# Patient Record
Sex: FEMALE | Race: WHITE | Employment: OTHER | URBAN - METROPOLITAN AREA
[De-identification: names, ages, dates, MRNs, and addresses within clinical notes are randomized per-mention and may not be internally consistent; named-entity substitution may affect disease eponyms.]

---

## 2023-07-12 ENCOUNTER — TELEPHONE (OUTPATIENT)
Dept: PRIMARY CARE | Facility: CLINIC | Age: 68
End: 2023-07-12
Payer: MEDICARE

## 2023-07-12 DIAGNOSIS — E78.89 LIPIDS ABNORMAL: ICD-10-CM

## 2023-07-12 RX ORDER — LEVOTHYROXINE SODIUM 75 UG/1
75 TABLET ORAL DAILY
COMMUNITY
End: 2023-07-12 | Stop reason: SDUPTHER

## 2023-07-12 RX ORDER — LEVOTHYROXINE SODIUM 75 UG/1
75 TABLET ORAL DAILY
Qty: 90 TABLET | Refills: 0 | Status: SHIPPED | OUTPATIENT
Start: 2023-07-12 | End: 2023-10-12 | Stop reason: SDUPTHER

## 2023-08-07 ENCOUNTER — LAB (OUTPATIENT)
Dept: LAB | Facility: LAB | Age: 68
End: 2023-08-07
Payer: MEDICARE

## 2023-08-07 DIAGNOSIS — Z00.00 HEALTHCARE MAINTENANCE: ICD-10-CM

## 2023-08-07 LAB
ALANINE AMINOTRANSFERASE (SGPT) (U/L) IN SER/PLAS: 11 U/L (ref 7–45)
ALBUMIN (G/DL) IN SER/PLAS: 4.1 G/DL (ref 3.4–5)
ALKALINE PHOSPHATASE (U/L) IN SER/PLAS: 63 U/L (ref 33–136)
ANION GAP IN SER/PLAS: 12 MMOL/L (ref 10–20)
APPEARANCE, URINE: ABNORMAL
ASPARTATE AMINOTRANSFERASE (SGOT) (U/L) IN SER/PLAS: 17 U/L (ref 9–39)
BASOPHILS (10*3/UL) IN BLOOD BY AUTOMATED COUNT: 0.05 X10E9/L (ref 0–0.1)
BASOPHILS/100 LEUKOCYTES IN BLOOD BY AUTOMATED COUNT: 1 % (ref 0–2)
BILIRUBIN TOTAL (MG/DL) IN SER/PLAS: 0.4 MG/DL (ref 0–1.2)
BILIRUBIN, URINE: NEGATIVE
BLOOD, URINE: NEGATIVE
CALCIDIOL (25 OH VITAMIN D3) (NG/ML) IN SER/PLAS: 30 NG/ML
CALCIUM (MG/DL) IN SER/PLAS: 8.6 MG/DL (ref 8.6–10.3)
CARBON DIOXIDE, TOTAL (MMOL/L) IN SER/PLAS: 27 MMOL/L (ref 21–32)
CHLORIDE (MMOL/L) IN SER/PLAS: 104 MMOL/L (ref 98–107)
CHOLESTEROL (MG/DL) IN SER/PLAS: 273 MG/DL (ref 0–199)
CHOLESTEROL IN HDL (MG/DL) IN SER/PLAS: 60.1 MG/DL
CHOLESTEROL/HDL RATIO: 4.5
COLOR, URINE: YELLOW
CREATININE (MG/DL) IN SER/PLAS: 0.83 MG/DL (ref 0.5–1.05)
EOSINOPHILS (10*3/UL) IN BLOOD BY AUTOMATED COUNT: 0.07 X10E9/L (ref 0–0.7)
EOSINOPHILS/100 LEUKOCYTES IN BLOOD BY AUTOMATED COUNT: 1.5 % (ref 0–6)
ERYTHROCYTE DISTRIBUTION WIDTH (RATIO) BY AUTOMATED COUNT: 13 % (ref 11.5–14.5)
ERYTHROCYTE MEAN CORPUSCULAR HEMOGLOBIN CONCENTRATION (G/DL) BY AUTOMATED: 31.8 G/DL (ref 32–36)
ERYTHROCYTE MEAN CORPUSCULAR VOLUME (FL) BY AUTOMATED COUNT: 90 FL (ref 80–100)
ERYTHROCYTES (10*6/UL) IN BLOOD BY AUTOMATED COUNT: 4.96 X10E12/L (ref 4–5.2)
GFR FEMALE: 77 ML/MIN/1.73M2
GLUCOSE (MG/DL) IN SER/PLAS: 104 MG/DL (ref 74–99)
GLUCOSE, URINE: NEGATIVE MG/DL
HEMATOCRIT (%) IN BLOOD BY AUTOMATED COUNT: 44.7 % (ref 36–46)
HEMOGLOBIN (G/DL) IN BLOOD: 14.2 G/DL (ref 12–16)
IMMATURE GRANULOCYTES/100 LEUKOCYTES IN BLOOD BY AUTOMATED COUNT: 0.2 % (ref 0–0.9)
KETONES, URINE: NEGATIVE MG/DL
LDL: 188 MG/DL (ref 0–99)
LEUKOCYTE ESTERASE, URINE: ABNORMAL
LEUKOCYTES (10*3/UL) IN BLOOD BY AUTOMATED COUNT: 4.8 X10E9/L (ref 4.4–11.3)
LYMPHOCYTES (10*3/UL) IN BLOOD BY AUTOMATED COUNT: 1.91 X10E9/L (ref 1.2–4.8)
LYMPHOCYTES/100 LEUKOCYTES IN BLOOD BY AUTOMATED COUNT: 39.6 % (ref 13–44)
MONOCYTES (10*3/UL) IN BLOOD BY AUTOMATED COUNT: 0.42 X10E9/L (ref 0.1–1)
MONOCYTES/100 LEUKOCYTES IN BLOOD BY AUTOMATED COUNT: 8.7 % (ref 2–10)
MUCUS, URINE: ABNORMAL /LPF
NEUTROPHILS (10*3/UL) IN BLOOD BY AUTOMATED COUNT: 2.36 X10E9/L (ref 1.2–7.7)
NEUTROPHILS/100 LEUKOCYTES IN BLOOD BY AUTOMATED COUNT: 49 % (ref 40–80)
NITRITE, URINE: NEGATIVE
PH, URINE: 5 (ref 5–8)
PLATELETS (10*3/UL) IN BLOOD AUTOMATED COUNT: 193 X10E9/L (ref 150–450)
POTASSIUM (MMOL/L) IN SER/PLAS: 4.3 MMOL/L (ref 3.5–5.3)
PROTEIN TOTAL: 6.4 G/DL (ref 6.4–8.2)
PROTEIN, URINE: NEGATIVE MG/DL
RBC, URINE: 1 /HPF (ref 0–5)
SODIUM (MMOL/L) IN SER/PLAS: 139 MMOL/L (ref 136–145)
SPECIFIC GRAVITY, URINE: 1.02 (ref 1–1.03)
SQUAMOUS EPITHELIAL CELLS, URINE: 13 /HPF
THYROTROPIN (MIU/L) IN SER/PLAS BY DETECTION LIMIT <= 0.05 MIU/L: 4.49 MIU/L (ref 0.44–3.98)
THYROXINE (T4) FREE (NG/DL) IN SER/PLAS: 0.86 NG/DL (ref 0.61–1.12)
TRIGLYCERIDE (MG/DL) IN SER/PLAS: 124 MG/DL (ref 0–149)
UREA NITROGEN (MG/DL) IN SER/PLAS: 19 MG/DL (ref 6–23)
UROBILINOGEN, URINE: <2 MG/DL (ref 0–1.9)
VLDL: 25 MG/DL (ref 0–40)
WBC, URINE: 9 /HPF (ref 0–5)

## 2023-08-07 PROCEDURE — 80053 COMPREHEN METABOLIC PANEL: CPT

## 2023-08-07 PROCEDURE — 82306 VITAMIN D 25 HYDROXY: CPT

## 2023-08-07 PROCEDURE — 84443 ASSAY THYROID STIM HORMONE: CPT

## 2023-08-07 PROCEDURE — 85025 COMPLETE CBC W/AUTO DIFF WBC: CPT

## 2023-08-07 PROCEDURE — 80061 LIPID PANEL: CPT

## 2023-08-07 PROCEDURE — 36415 COLL VENOUS BLD VENIPUNCTURE: CPT

## 2023-08-07 PROCEDURE — 81001 URINALYSIS AUTO W/SCOPE: CPT

## 2023-08-07 PROCEDURE — 84439 ASSAY OF FREE THYROXINE: CPT

## 2023-08-09 ENCOUNTER — OFFICE VISIT (OUTPATIENT)
Dept: PRIMARY CARE | Facility: CLINIC | Age: 68
End: 2023-08-09
Payer: MEDICARE

## 2023-08-09 VITALS
HEIGHT: 66 IN | HEART RATE: 63 BPM | RESPIRATION RATE: 16 BRPM | BODY MASS INDEX: 25.75 KG/M2 | DIASTOLIC BLOOD PRESSURE: 71 MMHG | WEIGHT: 160.2 LBS | SYSTOLIC BLOOD PRESSURE: 105 MMHG | OXYGEN SATURATION: 97 %

## 2023-08-09 DIAGNOSIS — Z00.00 HEALTHCARE MAINTENANCE: Primary | ICD-10-CM

## 2023-08-09 DIAGNOSIS — Z12.12 SCREENING FOR COLORECTAL CANCER: ICD-10-CM

## 2023-08-09 DIAGNOSIS — Z13.6 SCREENING FOR CARDIOVASCULAR CONDITION: ICD-10-CM

## 2023-08-09 DIAGNOSIS — Z12.11 SCREENING FOR COLORECTAL CANCER: ICD-10-CM

## 2023-08-09 PROBLEM — R51.9 BILATERAL HEADACHES: Status: ACTIVE | Noted: 2023-08-09
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
## 2023-08-09 PROBLEM — M75.80 ROTATOR CUFF TENDINITIS: Status: ACTIVE | Noted: 2023-08-09

## 2023-08-09 PROBLEM — E78.5 HYPERLIPIDEMIA: Status: ACTIVE | Noted: 2023-08-09

## 2023-08-09 PROBLEM — R42 VERTIGO: Status: ACTIVE | Noted: 2023-08-09

## 2023-08-09 PROBLEM — M19.041 PRIMARY OSTEOARTHRITIS OF BOTH HANDS: Status: ACTIVE | Noted: 2023-08-09

## 2023-08-09 PROBLEM — M19.042 PRIMARY OSTEOARTHRITIS OF BOTH HANDS: Status: ACTIVE | Noted: 2023-08-09

## 2023-08-09 PROBLEM — K21.9 GERD (GASTROESOPHAGEAL REFLUX DISEASE): Status: ACTIVE | Noted: 2023-08-09

## 2023-08-09 PROBLEM — H61.22 IMPACTED CERUMEN OF LEFT EAR: Status: ACTIVE | Noted: 2023-08-09

## 2023-08-09 PROBLEM — S16.1XXA NECK STRAIN: Status: ACTIVE | Noted: 2023-08-09

## 2023-08-09 PROBLEM — R82.998 LEUKOCYTES IN URINE: Status: ACTIVE | Noted: 2023-08-09

## 2023-08-09 PROBLEM — E79.0 HYPERURICEMIA: Status: ACTIVE | Noted: 2023-08-09

## 2023-08-09 PROBLEM — I10 BENIGN ESSENTIAL HYPERTENSION: Status: ACTIVE | Noted: 2023-08-09

## 2023-08-09 PROBLEM — M25.512 LEFT SHOULDER PAIN: Status: ACTIVE | Noted: 2023-08-09

## 2023-08-09 PROBLEM — S46.219A BICEPS STRAIN: Status: ACTIVE | Noted: 2023-08-09

## 2023-08-09 PROBLEM — M25.50 JOINT PAIN: Status: ACTIVE | Noted: 2023-08-09

## 2023-08-09 PROBLEM — M54.17 LUMBOSACRAL NEURITIS: Status: ACTIVE | Noted: 2023-08-09

## 2023-08-09 PROBLEM — M13.0 POLYARTHRITIS: Status: ACTIVE | Noted: 2023-08-09

## 2023-08-09 PROBLEM — M17.12 PRIMARY OSTEOARTHRITIS OF LEFT KNEE: Status: ACTIVE | Noted: 2023-08-09

## 2023-08-09 PROBLEM — E78.00 ELEVATED SERUM CHOLESTEROL: Status: ACTIVE | Noted: 2023-08-09

## 2023-08-09 PROBLEM — E55.9 VITAMIN D DEFICIENCY: Status: ACTIVE | Noted: 2023-08-09

## 2023-08-09 PROBLEM — K64.9 HEMORRHOID: Status: ACTIVE | Noted: 2023-08-09

## 2023-08-09 PROBLEM — E03.9 HYPOTHYROIDISM: Status: ACTIVE | Noted: 2023-08-09

## 2023-08-09 PROBLEM — H93.8X2 FULLNESS IN EAR, LEFT: Status: ACTIVE | Noted: 2023-08-09

## 2023-08-09 PROCEDURE — G0439 PPPS, SUBSEQ VISIT: HCPCS | Performed by: INTERNAL MEDICINE

## 2023-08-09 PROCEDURE — 1170F FXNL STATUS ASSESSED: CPT | Performed by: INTERNAL MEDICINE

## 2023-08-09 PROCEDURE — 1159F MED LIST DOCD IN RCRD: CPT | Performed by: INTERNAL MEDICINE

## 2023-08-09 PROCEDURE — 3078F DIAST BP <80 MM HG: CPT | Performed by: INTERNAL MEDICINE

## 2023-08-09 PROCEDURE — 1036F TOBACCO NON-USER: CPT | Performed by: INTERNAL MEDICINE

## 2023-08-09 PROCEDURE — 3074F SYST BP LT 130 MM HG: CPT | Performed by: INTERNAL MEDICINE

## 2023-08-09 RX ORDER — SODIUM CHLORIDE 0.9 % (FLUSH) 0.9 %
10 SYRINGE (ML) INJECTION
COMMUNITY
Start: 2023-05-12 | End: 2024-08-10

## 2023-08-09 RX ORDER — PANTOPRAZOLE SODIUM 40 MG/1
TABLET, DELAYED RELEASE ORAL
COMMUNITY
Start: 2016-03-29

## 2023-08-09 RX ORDER — MULTIVIT,IRON,MINERALS/LUTEIN
1 TABLET ORAL DAILY
COMMUNITY
Start: 2019-04-15

## 2023-08-09 RX ORDER — FLUTICASONE PROPIONATE 50 MCG
2 SPRAY, SUSPENSION (ML) NASAL DAILY
COMMUNITY
Start: 2023-03-29

## 2023-08-09 RX ORDER — CHOLECALCIFEROL (VITAMIN D3) 50 MCG
1 TABLET ORAL DAILY
COMMUNITY
Start: 2017-05-22

## 2023-08-09 RX ORDER — ATORVASTATIN CALCIUM 40 MG/1
40 TABLET, FILM COATED ORAL
COMMUNITY
Start: 2023-07-21

## 2023-08-09 RX ORDER — AMLODIPINE BESYLATE 2.5 MG/1
2.5 TABLET ORAL DAILY
COMMUNITY
End: 2024-04-09 | Stop reason: SDUPTHER

## 2023-08-09 ASSESSMENT — ENCOUNTER SYMPTOMS
DEPRESSION: 0
LOSS OF SENSATION IN FEET: 0
OCCASIONAL FEELINGS OF UNSTEADINESS: 0

## 2023-08-09 ASSESSMENT — ACTIVITIES OF DAILY LIVING (ADL)
DRESSING: INDEPENDENT
BATHING: INDEPENDENT
DOING_HOUSEWORK: INDEPENDENT
MANAGING_FINANCES: INDEPENDENT
GROCERY_SHOPPING: INDEPENDENT
TAKING_MEDICATION: INDEPENDENT

## 2023-08-09 ASSESSMENT — PATIENT HEALTH QUESTIONNAIRE - PHQ9
1. LITTLE INTEREST OR PLEASURE IN DOING THINGS: NOT AT ALL
1. LITTLE INTEREST OR PLEASURE IN DOING THINGS: NOT AT ALL
SUM OF ALL RESPONSES TO PHQ9 QUESTIONS 1 AND 2: 0
2. FEELING DOWN, DEPRESSED OR HOPELESS: NOT AT ALL
SUM OF ALL RESPONSES TO PHQ9 QUESTIONS 1 AND 2: 0
2. FEELING DOWN, DEPRESSED OR HOPELESS: NOT AT ALL

## 2023-08-09 NOTE — PROGRESS NOTES
"Subjective   Reason for Visit: Charlotte Phan is an 68 y.o. female here for a Medicare Wellness visit.     68 F    HTN    HLD    Hypothyroid               Reviewed all medications by prescribing practitioner or clinical pharmacist (such as prescriptions, OTCs, herbal therapies and supplements) and documented in the medical record.    HPI    Patient Care Team:  Harvey Parks MD as PCP - General  Harvey Parks MD as PCP - [facility] Attributed Provider     Review of Systems      No Fever/chills/headaches/dizziness/chest pains/ shortness of breath/palpitations/Nausea/vomiting/diarrhea/ constipation/urine frequency/blood in urine.      Objective   Vitals:  /71 (BP Location: Left arm, Patient Position: Sitting, BP Cuff Size: Adult)   Pulse 63   Resp 16   Ht 1.665 m (5' 5.55\")   Wt 72.7 kg (160 lb 3.2 oz)   SpO2 97%   BMI 26.21 kg/m²       Physical Exam    No JVP elevation. No palpable Lymph Nodes. No Thyromegaly    CVS-NL S1/S2 . No MRG    Lungs-CTA. B/S= B/L    Abdomen-Soft, Non-tender. No masses or HSM    Extremities: No C/C/E      Assessment/Plan   Problem List Items Addressed This Visit    None  Visit Diagnoses       Screening for cardiovascular condition    -  Primary    Relevant Orders    CT cardiac scoring wo IV contrast    Healthcare maintenance        Relevant Orders    TSH with reflex to Free T4 if abnormal (Completed)    Lipid Panel (Completed)    Comprehensive Metabolic Panel (Completed)    CBC and Auto Differential (Completed)    Urinalysis with Reflex Microscopic (Completed)    Vitamin D 25-Hydroxy,Total (Completed)        HTN    HLD    Hypothyroid    TAA     Plan:    CT-Calcium score for risk stratification    Cologuard( Declined Colonoscopy)    Mammogram    MRA in 6 months re TAA per cardiology at [facility]    Continue with current Rx    Follow up in 12 months /PRN        "

## 2023-08-25 LAB — NONINV COLON CA DNA+OCC BLD SCRN STL QL: NEGATIVE

## 2023-08-28 ENCOUNTER — TELEPHONE (OUTPATIENT)
Dept: PRIMARY CARE | Facility: CLINIC | Age: 68
End: 2023-08-28
Payer: MEDICARE

## 2023-08-28 NOTE — TELEPHONE ENCOUNTER
Called patient and informed her of B message about her results. Patient was in full understanding and had no other questions or concerns.       Ariane Murillo MA

## 2023-10-12 ENCOUNTER — CLINICAL SUPPORT (OUTPATIENT)
Dept: AUDIOLOGY | Facility: CLINIC | Age: 68
End: 2023-10-12
Payer: MEDICARE

## 2023-10-12 ENCOUNTER — OFFICE VISIT (OUTPATIENT)
Dept: OTOLARYNGOLOGY | Facility: CLINIC | Age: 68
End: 2023-10-12
Payer: MEDICARE

## 2023-10-12 VITALS
DIASTOLIC BLOOD PRESSURE: 69 MMHG | BODY MASS INDEX: 26.66 KG/M2 | TEMPERATURE: 97.9 F | WEIGHT: 156.13 LBS | RESPIRATION RATE: 16 BRPM | SYSTOLIC BLOOD PRESSURE: 121 MMHG | HEART RATE: 55 BPM | HEIGHT: 64 IN

## 2023-10-12 DIAGNOSIS — H91.20 SUDDEN-ONSET SENSORINEURAL HEARING LOSS: Primary | ICD-10-CM

## 2023-10-12 DIAGNOSIS — H93.8X2 FULLNESS IN EAR, LEFT: ICD-10-CM

## 2023-10-12 DIAGNOSIS — E78.89 LIPIDS ABNORMAL: ICD-10-CM

## 2023-10-12 DIAGNOSIS — J02.9 ACUTE PHARYNGITIS, UNSPECIFIED ETIOLOGY: ICD-10-CM

## 2023-10-12 DIAGNOSIS — H90.3 SENSORINEURAL HEARING LOSS, BILATERAL: Primary | ICD-10-CM

## 2023-10-12 PROCEDURE — 92550 TYMPANOMETRY & REFLEX THRESH: CPT | Performed by: AUDIOLOGIST

## 2023-10-12 PROCEDURE — 1160F RVW MEDS BY RX/DR IN RCRD: CPT | Performed by: STUDENT IN AN ORGANIZED HEALTH CARE EDUCATION/TRAINING PROGRAM

## 2023-10-12 PROCEDURE — 99214 OFFICE O/P EST MOD 30 MIN: CPT | Performed by: STUDENT IN AN ORGANIZED HEALTH CARE EDUCATION/TRAINING PROGRAM

## 2023-10-12 PROCEDURE — 3078F DIAST BP <80 MM HG: CPT | Performed by: STUDENT IN AN ORGANIZED HEALTH CARE EDUCATION/TRAINING PROGRAM

## 2023-10-12 PROCEDURE — 1126F AMNT PAIN NOTED NONE PRSNT: CPT | Performed by: STUDENT IN AN ORGANIZED HEALTH CARE EDUCATION/TRAINING PROGRAM

## 2023-10-12 PROCEDURE — 1159F MED LIST DOCD IN RCRD: CPT | Performed by: STUDENT IN AN ORGANIZED HEALTH CARE EDUCATION/TRAINING PROGRAM

## 2023-10-12 PROCEDURE — 1036F TOBACCO NON-USER: CPT | Performed by: STUDENT IN AN ORGANIZED HEALTH CARE EDUCATION/TRAINING PROGRAM

## 2023-10-12 PROCEDURE — 92557 COMPREHENSIVE HEARING TEST: CPT | Performed by: AUDIOLOGIST

## 2023-10-12 PROCEDURE — 3074F SYST BP LT 130 MM HG: CPT | Performed by: STUDENT IN AN ORGANIZED HEALTH CARE EDUCATION/TRAINING PROGRAM

## 2023-10-12 RX ORDER — LEVOTHYROXINE SODIUM 75 UG/1
75 TABLET ORAL DAILY
Qty: 90 TABLET | Refills: 3 | Status: SHIPPED | OUTPATIENT
Start: 2023-10-12 | End: 2024-10-11

## 2023-10-12 RX ORDER — ASCORBIC ACID 500 MG
500 TABLET ORAL DAILY
COMMUNITY

## 2023-10-12 RX ORDER — PREDNISONE 10 MG/1
60 TABLET ORAL DAILY
Qty: 42 TABLET | Refills: 0 | Status: SHIPPED | OUTPATIENT
Start: 2023-10-12 | End: 2023-10-19

## 2023-10-12 ASSESSMENT — ENCOUNTER SYMPTOMS
LOSS OF SENSATION IN FEET: 0
DEPRESSION: 0
OCCASIONAL FEELINGS OF UNSTEADINESS: 0

## 2023-10-12 ASSESSMENT — COLUMBIA-SUICIDE SEVERITY RATING SCALE - C-SSRS
6. HAVE YOU EVER DONE ANYTHING, STARTED TO DO ANYTHING, OR PREPARED TO DO ANYTHING TO END YOUR LIFE?: NO
1. IN THE PAST MONTH, HAVE YOU WISHED YOU WERE DEAD OR WISHED YOU COULD GO TO SLEEP AND NOT WAKE UP?: NO
2. HAVE YOU ACTUALLY HAD ANY THOUGHTS OF KILLING YOURSELF?: NO

## 2023-10-12 ASSESSMENT — PATIENT HEALTH QUESTIONNAIRE - PHQ9
SUM OF ALL RESPONSES TO PHQ9 QUESTIONS 1 AND 2: 0
1. LITTLE INTEREST OR PLEASURE IN DOING THINGS: NOT AT ALL
2. FEELING DOWN, DEPRESSED OR HOPELESS: NOT AT ALL

## 2023-10-12 ASSESSMENT — PAIN SCALES - GENERAL
PAINLEVEL: 0-NO PAIN
PAINLEVEL_OUTOF10: 0 - NO PAIN

## 2023-10-12 ASSESSMENT — PAIN - FUNCTIONAL ASSESSMENT: PAIN_FUNCTIONAL_ASSESSMENT: 0-10

## 2023-10-12 NOTE — LETTER
October 12, 2023     Shaquille Torres MD  [address] [facility] [address]  [facility] [address]    Patient: Charlotte Phan   YOB: 1955   Date of Visit: 10/12/2023       Dear Dr. Shaquille Torres MD:    Thank you for referring Charlotte Phan to me for evaluation. Below are my notes for this consultation.  If you have questions, please do not hesitate to call me. I look forward to following your patient along with you.       Sincerely,     Selin Leahy, JUSTIN, CCC-A      CC: No Recipients  ______________________________________________________________________________________    Name: Charlotte Phan  YOB: 1955  Age: 68 y.o.    Date of Evaluation:  10/12/2023      History:  Reason for visit:  Charlotte Phan is seen today at the request of Shaquille Torres MD for an evaluation of hearing.  Patient complains of Ear Fullness and Hearing Loss.  She states that she had a flight about 2 weeks ago and had bilateral aural fullness.  She was diagnosed and treated for an ear infection and the fullness has improved.  She also noticed a change in hearing in the left ear.  Her right ear has always been her worse hearing ear and it seems stable.  She denies tinnitus and dizziness.    Evaluation:    Otoscopy revealed clear ear canal and tympanic membrane visualized bilaterally  Immittance testing indicated normal middle ear pressure, mobility, and ear canal volume bilaterally.  Ipsilateral acoustic reflexes were absent in the right ear and present in the left ear at 500-4000 Hz.    Behavioral hearing testing indicated normal hearing sloping to moderate sensorineural hearing loss  at 125-8000 Hz.  Right ear thresholds are stable, left ear are progressive.  Word recognition testing was completed using recorded speech at the patient's most comfortable level as documented on the audiogram.  Scores were fair bilaterally.    Impression:    Testing indicated normal hearing sloping to moderate  sensorineural hearing loss with normal middle ear function bilaterally.    Care Plan:    Follow-up with Dr. Torres.  Retest hearing with otologic management, or annually to monitor.  Hearing aid evaluation pending medical clearance.    JUSTIN Beltran, CCC-A  Audiologist        Time: [phone]

## 2023-10-12 NOTE — ASSESSMENT & PLAN NOTE
The patient has known bilateral sensorineural hearing loss worse on the right.  However, the patient reports that after several trips what sounds like acute viral upper respiratory infection she noted sudden onset worsening of left-sided aural fullness.  She presents today with a new audiogram that demonstrates a sudden drop in the left-sided hearing.    The etiologies of sudden sensorineural hearing loss were discussed with the patient.  We discussed in detail the options available which include observation, HBOT, and medical therapy.  She has recent MRI imaging that demonstrates no concerning lesions.  The patient understands that any of these options may not result in improvement in their hearing.  The risks and benefits of oral versus intratympanic steroids were discussed.  The patient would like to trial a course of oral steroids and follow-up in 2 weeks with repeat audiogram.  Depending her response we could then consider salvage intratympanic steroid injections.

## 2023-10-12 NOTE — PATIENT INSTRUCTIONS
It appears that during your recent illness and travel you developed a sudden change in the nerve function of your left hearing. This is called a sudden sensorineural hearing loss. You are in a period of time that this can respond to therapy, although the earlier we treat the better. The best treatment options include either steroid pills or steroid injections (behind the ear drum). Neither is considered better in terms of efficacy and response to treatment is not guaranteed.    If you take the pills please follow up in 2 weeks with a repeat hearing test.    -------------------------------------------    Taking oral steroids comes with certain risks. One can experience changes in mood, changes in sleep patterns, and memory issues or confusion. In the short term steroids can worsen one's Diabetes and raise blood sugar levels. They can also increase the pressure in one's eyes if one is susceptible to glaucoma. There is also risk of gaining weight. Taking these medications in the long term can result in clouded vision (cataracts), an increased risk of infections, changes in skin or increased bruising, and risk of fractures or joint destruction (avascular necrosis of the hip).

## 2023-10-12 NOTE — PROGRESS NOTES
Name: Charlotte Phan  YOB: 1955  Age: 68 y.o.    Date of Evaluation:  10/12/2023      History:  Reason for visit:  Charlotte Phan is seen today at the request of Shaquille Torres MD for an evaluation of hearing.  Patient complains of Ear Fullness and Hearing Loss.  She states that she had a flight about 2 weeks ago and had bilateral aural fullness.  She was diagnosed and treated for an ear infection and the fullness has improved.  She also noticed a change in hearing in the left ear.  Her right ear has always been her worse hearing ear and it seems stable.  She denies tinnitus and dizziness.    Evaluation:    Otoscopy revealed clear ear canal and tympanic membrane visualized bilaterally  Immittance testing indicated normal middle ear pressure, mobility, and ear canal volume bilaterally.  Ipsilateral acoustic reflexes were absent in the right ear and present in the left ear at 500-4000 Hz.    Behavioral hearing testing indicated normal hearing sloping to moderate sensorineural hearing loss  at 125-8000 Hz.  Right ear thresholds are stable, left ear are progressive.  Word recognition testing was completed using recorded speech at the patient's most comfortable level as documented on the audiogram.  Scores were fair bilaterally.      Impression:    Testing indicated normal hearing sloping to moderate sensorineural hearing loss with normal middle ear function bilaterally.    Care Plan:    Follow-up with Dr. Torres.  Retest hearing with otologic management, or annually to monitor.  Hearing aid evaluation pending medical clearance.    JUSTIN Beltran, CCC-A  Audiologist        Time: [phone]

## 2023-10-12 NOTE — PROGRESS NOTES
Subjective   Patient ID: Charlotte Phan is a 68 y.o. female who presents for Follow-up and Hearing Loss (Throat issues/drainage).  History: 67 year-old female self-referred for evaluation of multiple concerns. She presents today with her  who will also be seen.    The patient reports that she has started noticing that the right ear has increased hearing loss. No recent audiogram. She is noting occasional episodes of vertigo. Sometimes episodes have lasted 1-12 hours. Gets intermittent tinnitus but no aural fullness. MRI brain demonstrated some chronic hematoma. No brain lesion per her report. She denies otalgia, otorrhea but does get itching. She denies a history of prior ear surgery, exposure to ototoxic drugs or agents. 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    She is also noting frequent sinus infections. She notes she will get an infection roughly once a year. Otherwise notes some mild nasal congestion at night no other nasal concerns.    She is also noting snoring; somewhat positional. No witnessed apneas. Has some daytime fatigue but this is somewhat attributed to poor sleep hygiene.    Update 5/8/2023:  Follow-up for hearing concerns.  - Returns today with an audiogram.  - Notes that nasal breathing is improved with Flonase most days. However, still snoring.     Update 10/12/2023:  Follow-up for new hearing issue. Reports that after a flight two weeks ago she noted sudden change in hearing on the right. This was during what sounds like a viral URI and acute exacerbation of ETD as well. She was prescribed an antibiotic which helped some of her symptoms but she has noted persistent aural fullness.        Objective   Physical Exam  Constitutional:       Appearance: Normal appearance.   HENT:      Right Ear: Tympanic membrane, ear canal and external ear normal.      Left Ear: Tympanic membrane, ear canal and external ear normal.       Mouth/Throat:      Pharynx: Oropharynx is clear. No pharyngeal swelling, oropharyngeal exudate or posterior oropharyngeal erythema.   Pulmonary:      Effort: Pulmonary effort is normal.   Neurological:      General: No focal deficit present.      Mental Status: She is alert.       Audiology:  I personally viewed the patient's audiogram from today.  This demonstrates a drop in the patient's sensorineural hearing loss on the left.  The patient now has a normal sloping to moderate sensorineural hearing loss on the left.  The patient's sensorineural hearing loss on the right is stable sloping from mild to moderate.  She has 72% word recognition on the right and 84% on the left.  She has type a tympanograms bilaterally.    Assessment/Plan   Problem List Items Addressed This Visit             ICD-10-CM    Fullness in ear, left H93.8X2    Sudden-onset sensorineural hearing loss - Primary H91.20     The patient has known bilateral sensorineural hearing loss worse on the right.  However, the patient reports that after several trips what sounds like acute viral upper respiratory infection she noted sudden onset worsening of left-sided aural fullness.  She presents today with a new audiogram that demonstrates a sudden drop in the left-sided hearing.    The etiologies of sudden sensorineural hearing loss were discussed with the patient.  We discussed in detail the options available which include observation, HBOT, and medical therapy.  She has recent MRI imaging that demonstrates no concerning lesions.  The patient understands that any of these options may not result in improvement in their hearing.  The risks and benefits of oral versus intratympanic steroids were discussed.  The patient would like to trial a course of oral steroids and follow-up in 2 weeks with repeat audiogram.  Depending her response we could then consider salvage intratympanic steroid injections.         Relevant Medications    predniSONE (Deltasone) 10 mg  tablet    Acute pharyngitis J02.9     The patient is noting recent symptoms consistent with viral URI. Symptoms have slowly improved but on exam today I reassured her that I do not appreciate any other concerning findings.

## 2023-10-17 PROBLEM — J02.9 ACUTE PHARYNGITIS: Status: ACTIVE | Noted: 2023-10-17

## 2023-10-17 NOTE — ASSESSMENT & PLAN NOTE
The patient is noting recent symptoms consistent with viral URI. Symptoms have slowly improved but on exam today I reassured her that I do not appreciate any other concerning findings.

## 2023-10-21 PROBLEM — J31.0 CHRONIC RHINITIS: Status: ACTIVE | Noted: 2023-10-21

## 2023-10-21 PROBLEM — J34.2 NASAL SEPTAL DEVIATION: Status: ACTIVE | Noted: 2023-10-21

## 2023-10-21 PROBLEM — H90.3 ASYMMETRICAL SENSORINEURAL HEARING LOSS: Status: ACTIVE | Noted: 2023-10-21

## 2023-10-24 ENCOUNTER — CLINICAL SUPPORT (OUTPATIENT)
Dept: AUDIOLOGY | Facility: CLINIC | Age: 68
End: 2023-10-24
Payer: MEDICARE

## 2023-10-24 ENCOUNTER — OFFICE VISIT (OUTPATIENT)
Dept: OTOLARYNGOLOGY | Facility: CLINIC | Age: 68
End: 2023-10-24
Payer: MEDICARE

## 2023-10-24 VITALS
SYSTOLIC BLOOD PRESSURE: 111 MMHG | TEMPERATURE: 98.2 F | DIASTOLIC BLOOD PRESSURE: 71 MMHG | HEIGHT: 64 IN | HEART RATE: 74 BPM | RESPIRATION RATE: 16 BRPM | WEIGHT: 154.6 LBS | BODY MASS INDEX: 26.4 KG/M2

## 2023-10-24 DIAGNOSIS — H91.20 SUDDEN-ONSET SENSORINEURAL HEARING LOSS: Primary | ICD-10-CM

## 2023-10-24 DIAGNOSIS — H90.3 ASYMMETRICAL SENSORINEURAL HEARING LOSS: Primary | ICD-10-CM

## 2023-10-24 PROCEDURE — 99213 OFFICE O/P EST LOW 20 MIN: CPT | Performed by: STUDENT IN AN ORGANIZED HEALTH CARE EDUCATION/TRAINING PROGRAM

## 2023-10-24 PROCEDURE — 3074F SYST BP LT 130 MM HG: CPT | Performed by: STUDENT IN AN ORGANIZED HEALTH CARE EDUCATION/TRAINING PROGRAM

## 2023-10-24 PROCEDURE — 3078F DIAST BP <80 MM HG: CPT | Performed by: STUDENT IN AN ORGANIZED HEALTH CARE EDUCATION/TRAINING PROGRAM

## 2023-10-24 PROCEDURE — 1036F TOBACCO NON-USER: CPT | Performed by: STUDENT IN AN ORGANIZED HEALTH CARE EDUCATION/TRAINING PROGRAM

## 2023-10-24 PROCEDURE — 1159F MED LIST DOCD IN RCRD: CPT | Performed by: STUDENT IN AN ORGANIZED HEALTH CARE EDUCATION/TRAINING PROGRAM

## 2023-10-24 PROCEDURE — 1125F AMNT PAIN NOTED PAIN PRSNT: CPT | Performed by: STUDENT IN AN ORGANIZED HEALTH CARE EDUCATION/TRAINING PROGRAM

## 2023-10-24 PROCEDURE — 92550 TYMPANOMETRY & REFLEX THRESH: CPT | Performed by: AUDIOLOGIST

## 2023-10-24 PROCEDURE — 92557 COMPREHENSIVE HEARING TEST: CPT | Performed by: AUDIOLOGIST

## 2023-10-24 PROCEDURE — 1160F RVW MEDS BY RX/DR IN RCRD: CPT | Performed by: STUDENT IN AN ORGANIZED HEALTH CARE EDUCATION/TRAINING PROGRAM

## 2023-10-24 ASSESSMENT — PATIENT HEALTH QUESTIONNAIRE - PHQ9
SUM OF ALL RESPONSES TO PHQ9 QUESTIONS 1 AND 2: 0
2. FEELING DOWN, DEPRESSED OR HOPELESS: NOT AT ALL
1. LITTLE INTEREST OR PLEASURE IN DOING THINGS: NOT AT ALL

## 2023-10-24 ASSESSMENT — COLUMBIA-SUICIDE SEVERITY RATING SCALE - C-SSRS
6. HAVE YOU EVER DONE ANYTHING, STARTED TO DO ANYTHING, OR PREPARED TO DO ANYTHING TO END YOUR LIFE?: NO
2. HAVE YOU ACTUALLY HAD ANY THOUGHTS OF KILLING YOURSELF?: NO
1. IN THE PAST MONTH, HAVE YOU WISHED YOU WERE DEAD OR WISHED YOU COULD GO TO SLEEP AND NOT WAKE UP?: NO

## 2023-10-24 ASSESSMENT — PAIN - FUNCTIONAL ASSESSMENT: PAIN_FUNCTIONAL_ASSESSMENT: 0-10

## 2023-10-24 ASSESSMENT — PAIN SCALES - GENERAL
PAINLEVEL: 4
PAINLEVEL_OUTOF10: 0 - NO PAIN

## 2023-10-24 NOTE — PROGRESS NOTES
Subjective   Patient ID: Charlotte Phan is a 68 y.o. female who presents for Hearing Loss  History: 67 year-old female self-referred for evaluation of multiple concerns. She presents today with her  who will also be seen.    The patient reports that she has started noticing that the right ear has increased hearing loss. No recent audiogram. She is noting occasional episodes of vertigo. Sometimes episodes have lasted 1-12 hours. Gets intermittent tinnitus but no aural fullness. MRI brain demonstrated some chronic hematoma. No brain lesion per her report. She denies otalgia, otorrhea but does get itching. She denies a history of prior ear surgery, exposure to ototoxic drugs or agents. 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    She is also noting frequent sinus infections. She notes she will get an infection roughly once a year. Otherwise notes some mild nasal congestion at night no other nasal concerns.    She is also noting snoring; somewhat positional. No witnessed apneas. Has some daytime fatigue but this is somewhat attributed to poor sleep hygiene.    Update 5/8/2023:  Follow-up for hearing concerns.  - Returns today with an audiogram.  - Notes that nasal breathing is improved with Flonase most days. However, still snoring.     Update 10/12/2023:  Follow-up for new hearing issue. Reports that after a flight two weeks ago she noted sudden change in hearing on the right. This was during what sounds like a viral URI and acute exacerbation of ETD as well. She was prescribed an antibiotic which helped some of her symptoms but she has noted persistent aural fullness.    Update 10/24/2023:  Follow-up for sudden sensorineural hearing loss.  Patient reports that initially when starting high-dose steroid she appreciated some improvement in left-sided aural fullness.  However, she now reports that this seems to have returned.  Here today with new  audiogram.    Objective   Physical Exam  CONSTITUTIONAL: Well appearing female who appears stated age.  PSYCHIATRIC: Alert, appropriate mood and affect.  RESPIRATORY: Normal inspiration and expiration and chest wall expansion; no use of accessory muscles to breathe.  VOICE: Clear speech without hoarseness. No stridor nor stertor.  HEAD AND FACE: Symmetric facial features. No cutaneous masses or lesions were visualized.  RIGHT EAR:  Normal external ear and post auricular area, no visible lesions, external auditory canal patent, tympanic membrane intact, no retraction, no signs of mass, effusion, or infection within the middle ear.  LEFT EAR: Normal external ear and post auricular area, no visible lesions, external auditory canal patent, tympanic membrane intact, no retraction, no signs of mass, effusion, or infection within the middle ear.     Audiology:  I personally viewed the patient's audiogram from today.  This demonstrates a stable drop in the patient's sensorineural hearing loss on the left.  The patient now has a normal sloping to moderate sensorineural hearing loss on the left.  The patient's sensorineural hearing loss on the right is stable sloping from mild to moderate.  She has 84% word recognition scores bilaterally.  She has type A tympanograms bilaterally.    Assessment/Plan   Problem List Items Addressed This Visit             ICD-10-CM    Sudden-onset sensorineural hearing loss - Primary H91.20     1.  Sudden sensorineural hearing loss, bilateral sensorineural hearing loss  The patient has known bilateral sensorineural hearing loss worse on the right.  However, the patient reports that after several recent trips and what sounds like acute viral upper respiratory infection she noted sudden onset worsening of left-sided aural fullness.  She presented with an updated audiogram that demonstrates a sudden drop in the left-sided hearing.     The etiologies of sudden sensorineural hearing loss were  previously discussed with the patient.  We discussed in detail the options available which include observation, HBOT, and medical therapy.  She has recent MRI imaging that demonstrates no concerning lesions.  The patient understands that any of these options may not result in improvement in their hearing.  She has not trialed a weeklong course of high-dose steroid with subjective improvement initially in symptoms.  However, repeat audiogram demonstrates persistent drop in sensorineural hearing loss on the left.  We discussed consideration of salvage intratympanic steroid injections but the patient wishes to discuss this with family prior to proceeding.  She will call if she wishes to schedule.  She understands that injections are more likely to help the earlier but they can be performed with a recommendation that they be started within 6 weeks of initial loss.    This note was created using speech recognition transcription software. Despite proofreading, typographical errors may be present that affect the meaning of the content. Please contact my office with any questions.

## 2023-10-24 NOTE — PROGRESS NOTES
Name: Charlotte Phan  YOB: 1955  Age: 68 y.o.    Date of Evaluation:  10/24/2023      History:  Reason for visit:  Charlotte Phan is seen today at the request of Shaquille Torres MD for an evaluation of hearing.  Patient presents for Follow-up.  She states that she noticed an improvement in hearing in the left ear while taking the oral steroids, but has noticed a decrease again over the past few days.    Evaluation:    Otoscopy revealed clear ear canal and tympanic membrane visualized bilaterally  Immittance testing indicated normal middle ear pressure, mobility, and ear canal volume bilaterally.  Ipsilateral acoustic reflexes were absent in the right ear and present in the left ear at 500-4000 Hz.    Behavioral hearing testing indicated normal hearing sloping to moderate sensorineural hearing loss  at 125-8000 Hz.  Thresholds are stable when compared to testing from 10/12/23.  Word recognition testing was completed using recorded speech at the patient's most comfortable level as documented on the audiogram.  Scores were good bilaterally.      Impression:    Testing indicated normal hearing sloping to moderate sensorineural hearing loss with normal middle ear function bilaterally.    Care Plan:    Follow-up with Dr. Torres.  Retest hearing with otologic management, or annually to monitor.  Hearing aid evaluation pending medical clearance.    JUSTIN Beltran, CCC-A  Audiologist    Time: [phone]

## 2023-10-24 NOTE — LETTER
October 24, 2023     Shaquille Torres MD  [address] [facility] [address] [facility] [address]  [facility] [address]    Patient: Charlotte Phan   YOB: 1955   Date of Visit: 10/24/2023       Dear Dr. Shaquille Torres MD:    Thank you for referring Charlotte Phan to me for evaluation. Below are my notes for this consultation.  If you have questions, please do not hesitate to call me. I look forward to following your patient along with you.       Sincerely,     Selin Leahy, JUSTIN, CCC-A      CC: No Recipients  ______________________________________________________________________________________    Name: Charlotte Phan  YOB: 1955  Age: 68 y.o.    Date of Evaluation:  10/24/2023      History:  Reason for visit:  Charlotte Phan is seen today at the request of Shaquille Torres MD for an evaluation of hearing.  Patient presents for Follow-up.  She states that she noticed an improvement in hearing in the left ear while taking the oral steroids, but has noticed a decrease again over the past few days.    Evaluation:    Otoscopy revealed clear ear canal and tympanic membrane visualized bilaterally  Immittance testing indicated normal middle ear pressure, mobility, and ear canal volume bilaterally.  Ipsilateral acoustic reflexes were absent in the right ear and present in the left ear at 500-4000 Hz.    Behavioral hearing testing indicated normal hearing sloping to moderate sensorineural hearing loss  at 125-8000 Hz.  Thresholds are stable when compared to testing from 10/12/23.  Word recognition testing was completed using recorded speech at the patient's most comfortable level as documented on the audiogram.  Scores were good bilaterally.    Impression:    Testing indicated normal hearing sloping to moderate sensorineural hearing loss with normal middle ear function bilaterally.    Care Plan:    Follow-up with Dr. Torres.  Retest hearing with otologic management, or annually to monitor.  Hearing aid  evaluation pending medical clearance.    JUSTIN Beltran, CCC-A  Audiologist    Time: [phone]

## 2023-10-31 ENCOUNTER — OFFICE VISIT (OUTPATIENT)
Dept: OTOLARYNGOLOGY | Facility: CLINIC | Age: 68
End: 2023-10-31
Payer: MEDICARE

## 2023-10-31 VITALS
HEIGHT: 65 IN | HEART RATE: 60 BPM | TEMPERATURE: 97.9 F | BODY MASS INDEX: 25.66 KG/M2 | DIASTOLIC BLOOD PRESSURE: 82 MMHG | SYSTOLIC BLOOD PRESSURE: 122 MMHG | WEIGHT: 154 LBS

## 2023-10-31 DIAGNOSIS — H91.20 SUDDEN-ONSET SENSORINEURAL HEARING LOSS: Primary | ICD-10-CM

## 2023-10-31 PROCEDURE — 1036F TOBACCO NON-USER: CPT | Performed by: STUDENT IN AN ORGANIZED HEALTH CARE EDUCATION/TRAINING PROGRAM

## 2023-10-31 PROCEDURE — 1126F AMNT PAIN NOTED NONE PRSNT: CPT | Performed by: STUDENT IN AN ORGANIZED HEALTH CARE EDUCATION/TRAINING PROGRAM

## 2023-10-31 PROCEDURE — 1159F MED LIST DOCD IN RCRD: CPT | Performed by: STUDENT IN AN ORGANIZED HEALTH CARE EDUCATION/TRAINING PROGRAM

## 2023-10-31 PROCEDURE — 3079F DIAST BP 80-89 MM HG: CPT | Performed by: STUDENT IN AN ORGANIZED HEALTH CARE EDUCATION/TRAINING PROGRAM

## 2023-10-31 PROCEDURE — 1160F RVW MEDS BY RX/DR IN RCRD: CPT | Performed by: STUDENT IN AN ORGANIZED HEALTH CARE EDUCATION/TRAINING PROGRAM

## 2023-10-31 PROCEDURE — 69801 INCISE INNER EAR: CPT | Performed by: STUDENT IN AN ORGANIZED HEALTH CARE EDUCATION/TRAINING PROGRAM

## 2023-10-31 PROCEDURE — 3074F SYST BP LT 130 MM HG: CPT | Performed by: STUDENT IN AN ORGANIZED HEALTH CARE EDUCATION/TRAINING PROGRAM

## 2023-10-31 PROCEDURE — 2500000004 HC RX 250 GENERAL PHARMACY W/ HCPCS (ALT 636 FOR OP/ED): Performed by: STUDENT IN AN ORGANIZED HEALTH CARE EDUCATION/TRAINING PROGRAM

## 2023-10-31 RX ORDER — DEXAMETHASONE SODIUM PHOSPHATE 100 MG/10ML
8 INJECTION INTRAMUSCULAR; INTRAVENOUS ONCE
Status: COMPLETED | OUTPATIENT
Start: 2023-10-31 | End: 2023-10-31

## 2023-10-31 RX ADMIN — DEXAMETHASONE SODIUM PHOSPHATE 8 MG: 10 INJECTION INTRAMUSCULAR; INTRAVENOUS at 11:00

## 2023-10-31 ASSESSMENT — PAIN SCALES - GENERAL: PAINLEVEL: 0-NO PAIN

## 2023-10-31 NOTE — PROGRESS NOTES
Subjective   Patient ID: Charlotte Phan is a 68 y.o. female who presents for Follow-up (INJECTION)  History: 67 year-old female self-referred for evaluation of multiple concerns. She presents today with her  who will also be seen.    The patient reports that she has started noticing that the right ear has increased hearing loss. No recent audiogram. She is noting occasional episodes of vertigo. Sometimes episodes have lasted 1-12 hours. Gets intermittent tinnitus but no aural fullness. MRI brain demonstrated some chronic hematoma. No brain lesion per her report. She denies otalgia, otorrhea but does get itching. She denies a history of prior ear surgery, exposure to ototoxic drugs or agents. 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    She is also noting frequent sinus infections. She notes she will get an infection roughly once a year. Otherwise notes some mild nasal congestion at night no other nasal concerns.    She is also noting snoring; somewhat positional. No witnessed apneas. Has some daytime fatigue but this is somewhat attributed to poor sleep hygiene.    Update 5/8/2023:  Follow-up for hearing concerns.  - Returns today with an audiogram.  - Notes that nasal breathing is improved with Flonase most days. However, still snoring.     Update 10/12/2023:  Follow-up for new hearing issue. Reports that after a flight two weeks ago she noted sudden change in hearing on the right. This was during what sounds like a viral URI and acute exacerbation of ETD as well. She was prescribed an antibiotic which helped some of her symptoms but she has noted persistent aural fullness.    Update 10/24/2023:  Follow-up for sudden sensorineural hearing loss.  Patient reports that initially when starting high-dose steroid she appreciated some improvement in left-sided aural fullness.  However, she now reports that this seems to have returned.  Here today  with new audiogram.    Update 10/31/2023:  Follow-up for sudden sensorineural hearing loss.  After last visit patient had a conversation with the family and opted for salvage intratympanic steroid injections.  Also noting some right-sided aural fullness today.    Objective   Physical Exam  CONSTITUTIONAL: Well appearing female who appears stated age.  PSYCHIATRIC: Alert, appropriate mood and affect.  RESPIRATORY: Normal inspiration and expiration and chest wall expansion; no use of accessory muscles to breathe.  VOICE: Clear speech without hoarseness. No stridor nor stertor.  HEAD AND FACE: Symmetric facial features. No cutaneous masses or lesions were visualized.  RIGHT EAR:  Normal external ear and post auricular area, no visible lesions, external auditory canal cleaned of some nonobstructive cerumen, tympanic membrane intact, no retraction, no signs of mass, effusion, or infection within the middle ear.  LEFT EAR: Normal external ear and post auricular area, no visible lesions, external auditory canal patent, tympanic membrane intact, no retraction, no signs of mass, effusion, or infection within the middle ear.     --------------------------------------------------  Procedure: Intratympanic injection, left  Indication: Sudden sensorineural hearing loss, left  Anesthesia: Topical phenol  Informed consent: Verbal consent was obtained following a discussion of the indications and risks of intratympanic steroid injection.    Procedure:  The patient was positioned, an appropriately sized ear speculum was introduced and the binocular microscope was brought into the field. After application of phenol to the postero-superior quadrant, Dexamethasone at 10 mg/mL was injected into the middle ear. Approximately 0.3 mL was injected. The patient was then left to rest in a supine position for 15 minutes. The procedure was well tolerated, there were no  complications.  --------------------------------------------------    Assessment/Plan   Problem List Items Addressed This Visit             ICD-10-CM    Sudden-onset sensorineural hearing loss - Primary H91.20    Relevant Medications    dexAMETHasone (Decadron) injection 8 mg (Start on 10/31/2023 11:15 AM)       1.  Sudden sensorineural hearing loss, bilateral sensorineural hearing loss  The patient has known bilateral sensorineural hearing loss worse on the right.  However, the patient reports that after several recent trips and what sounds like acute viral upper respiratory infection she noted sudden onset worsening of left-sided aural fullness.  She presented with an updated audiogram that demonstrates a sudden drop in the left-sided hearing.     The etiologies of sudden sensorineural hearing loss were previously discussed with the patient.  We discussed in detail the options available which include observation, HBOT, and medical therapy.  She has recent MRI imaging that demonstrates no concerning lesions.  The patient understands that any of these options may not result in improvement in their hearing.  She at first trialed a weeklong course of high-dose steroid with subjective improvement initially in symptoms.  However, repeat audiogram demonstrates persistent drop in sensorineural hearing loss on the left.  After our prior discussion the patient was interested in salvage intratympanic steroid injections.  We again discussed the risks and benefits including lack of improvement, vertigo, persistent perforation, and/or unforeseen risks.  The first of these injections were performed at today's visit.    She will follow-up in 1 week for second injection.    This note was created using speech recognition transcription software. Despite proofreading, typographical errors may be present that affect the meaning of the content. Please contact my office with any questions.

## 2023-11-06 DIAGNOSIS — H91.20 SUDDEN-ONSET SENSORINEURAL HEARING LOSS: ICD-10-CM

## 2023-11-07 ENCOUNTER — OFFICE VISIT (OUTPATIENT)
Dept: OTOLARYNGOLOGY | Facility: CLINIC | Age: 68
End: 2023-11-07
Payer: MEDICARE

## 2023-11-07 VITALS
RESPIRATION RATE: 16 BRPM | SYSTOLIC BLOOD PRESSURE: 117 MMHG | HEIGHT: 64 IN | BODY MASS INDEX: 26.6 KG/M2 | TEMPERATURE: 97.3 F | DIASTOLIC BLOOD PRESSURE: 73 MMHG | WEIGHT: 155.8 LBS | HEART RATE: 64 BPM

## 2023-11-07 DIAGNOSIS — H91.20 SUDDEN-ONSET SENSORINEURAL HEARING LOSS: Primary | ICD-10-CM

## 2023-11-07 PROCEDURE — 3074F SYST BP LT 130 MM HG: CPT | Performed by: STUDENT IN AN ORGANIZED HEALTH CARE EDUCATION/TRAINING PROGRAM

## 2023-11-07 PROCEDURE — 1126F AMNT PAIN NOTED NONE PRSNT: CPT | Performed by: STUDENT IN AN ORGANIZED HEALTH CARE EDUCATION/TRAINING PROGRAM

## 2023-11-07 PROCEDURE — 1036F TOBACCO NON-USER: CPT | Performed by: STUDENT IN AN ORGANIZED HEALTH CARE EDUCATION/TRAINING PROGRAM

## 2023-11-07 PROCEDURE — 69801 INCISE INNER EAR: CPT | Performed by: STUDENT IN AN ORGANIZED HEALTH CARE EDUCATION/TRAINING PROGRAM

## 2023-11-07 PROCEDURE — 1159F MED LIST DOCD IN RCRD: CPT | Performed by: STUDENT IN AN ORGANIZED HEALTH CARE EDUCATION/TRAINING PROGRAM

## 2023-11-07 PROCEDURE — 2500000004 HC RX 250 GENERAL PHARMACY W/ HCPCS (ALT 636 FOR OP/ED): Performed by: STUDENT IN AN ORGANIZED HEALTH CARE EDUCATION/TRAINING PROGRAM

## 2023-11-07 PROCEDURE — 3078F DIAST BP <80 MM HG: CPT | Performed by: STUDENT IN AN ORGANIZED HEALTH CARE EDUCATION/TRAINING PROGRAM

## 2023-11-07 PROCEDURE — 1160F RVW MEDS BY RX/DR IN RCRD: CPT | Performed by: STUDENT IN AN ORGANIZED HEALTH CARE EDUCATION/TRAINING PROGRAM

## 2023-11-07 RX ORDER — DEXAMETHASONE SODIUM PHOSPHATE 100 MG/10ML
10 INJECTION INTRAMUSCULAR; INTRAVENOUS ONCE
Status: COMPLETED | OUTPATIENT
Start: 2023-11-07 | End: 2023-11-07

## 2023-11-07 RX ADMIN — DEXAMETHASONE SODIUM PHOSPHATE 10 MG: 10 INJECTION INTRAMUSCULAR; INTRAVENOUS at 10:42

## 2023-11-07 SDOH — ECONOMIC STABILITY: FOOD INSECURITY: WITHIN THE PAST 12 MONTHS, THE FOOD YOU BOUGHT JUST DIDN'T LAST AND YOU DIDN'T HAVE MONEY TO GET MORE.: NEVER TRUE

## 2023-11-07 SDOH — ECONOMIC STABILITY: FOOD INSECURITY: WITHIN THE PAST 12 MONTHS, YOU WORRIED THAT YOUR FOOD WOULD RUN OUT BEFORE YOU GOT MONEY TO BUY MORE.: NEVER TRUE

## 2023-11-07 ASSESSMENT — PATIENT HEALTH QUESTIONNAIRE - PHQ9
1. LITTLE INTEREST OR PLEASURE IN DOING THINGS: NOT AT ALL
SUM OF ALL RESPONSES TO PHQ9 QUESTIONS 1 AND 2: 0
2. FEELING DOWN, DEPRESSED OR HOPELESS: NOT AT ALL

## 2023-11-07 ASSESSMENT — ENCOUNTER SYMPTOMS
OCCASIONAL FEELINGS OF UNSTEADINESS: 0
LOSS OF SENSATION IN FEET: 0
DEPRESSION: 0

## 2023-11-07 ASSESSMENT — COLUMBIA-SUICIDE SEVERITY RATING SCALE - C-SSRS
2. HAVE YOU ACTUALLY HAD ANY THOUGHTS OF KILLING YOURSELF?: NO
1. IN THE PAST MONTH, HAVE YOU WISHED YOU WERE DEAD OR WISHED YOU COULD GO TO SLEEP AND NOT WAKE UP?: NO
6. HAVE YOU EVER DONE ANYTHING, STARTED TO DO ANYTHING, OR PREPARED TO DO ANYTHING TO END YOUR LIFE?: NO

## 2023-11-07 ASSESSMENT — PAIN SCALES - GENERAL: PAINLEVEL: 0-NO PAIN

## 2023-11-07 NOTE — PROGRESS NOTES
Subjective   Patient ID: Charlotte Phan is a 68 y.o. female who presents for ears ( FUV- EAR injection)  History: 67 year-old female self-referred for evaluation of multiple concerns. She presents today with her  who will also be seen.    The patient reports that she has started noticing that the right ear has increased hearing loss. No recent audiogram. She is noting occasional episodes of vertigo. Sometimes episodes have lasted 1-12 hours. Gets intermittent tinnitus but no aural fullness. MRI brain demonstrated some chronic hematoma. No brain lesion per her report. She denies otalgia, otorrhea but does get itching. She denies a history of prior ear surgery, exposure to ototoxic drugs or agents. 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    She is also noting frequent sinus infections. She notes she will get an infection roughly once a year. Otherwise notes some mild nasal congestion at night no other nasal concerns.    She is also noting snoring; somewhat positional. No witnessed apneas. Has some daytime fatigue but this is somewhat attributed to poor sleep hygiene.    Update 5/8/2023:  Follow-up for hearing concerns.  - Returns today with an audiogram.  - Notes that nasal breathing is improved with Flonase most days. However, still snoring.     Update 10/12/2023:  Follow-up for new hearing issue. Reports that after a flight two weeks ago she noted sudden change in hearing on the right. This was during what sounds like a viral URI and acute exacerbation of ETD as well. She was prescribed an antibiotic which helped some of her symptoms but she has noted persistent aural fullness.    Update 10/24/2023:  Follow-up for sudden sensorineural hearing loss.  Patient reports that initially when starting high-dose steroid she appreciated some improvement in left-sided aural fullness.  However, she now reports that this seems to have returned.  Here  today with new audiogram.    Update 10/31/2023:  Follow-up for sudden sensorineural hearing loss.  After last visit patient had a conversation with the family and opted for salvage intratympanic steroid injections.  Also noting some right-sided aural fullness today.    Update 11/7/2023:  Follow-up for sudden sensorineural hearing loss.  Patient has not appreciated significant change in hearing since first injection.  Continues to note some right-sided aural fullness.  Here today for second injection.    Objective   Physical Exam  CONSTITUTIONAL: Well appearing female who appears stated age.  PSYCHIATRIC: Alert, appropriate mood and affect.  RESPIRATORY: Normal inspiration and expiration and chest wall expansion; no use of accessory muscles to breathe.  VOICE: Clear speech without hoarseness. No stridor nor stertor.  HEAD AND FACE: Symmetric facial features. No cutaneous masses or lesions were visualized.  RIGHT EAR:  Normal external ear and post auricular area, no visible lesions, external auditory canal clear, tympanic membrane intact, no retraction, no signs of mass, effusion, or infection within the middle ear.  LEFT EAR: Normal external ear and post auricular area, no visible lesions, external auditory canal patent, tympanic membrane intact with prior injection site healed, no retraction, no signs of mass, effusion, or infection within the middle ear.     --------------------------------------------------  Procedure: Intratympanic injection, left  Indication: Sudden sensorineural hearing loss, left  Anesthesia: Topical phenol  Informed consent: Verbal consent was obtained following a discussion of the indications and risks of intratympanic steroid injection.    Procedure:  The patient was positioned, an appropriately sized ear speculum was introduced and the binocular microscope was brought into the field. Dexamethasone at 10 mg/mL was injected into the middle ear. Approximately 0.3 mL was injected. The patient  was then left to rest in a supine position for 15 minutes. The procedure was well tolerated, there were no complications.  --------------------------------------------------    Assessment/Plan   Problem List Items Addressed This Visit             ICD-10-CM    Sudden-onset sensorineural hearing loss - Primary H91.20     68-year-old female presenting with sudden sensorineural hearing loss in the setting of prior sensorineural hearing loss.    1.  Sudden sensorineural hearing loss, bilateral sensorineural hearing loss  The patient has known bilateral sensorineural hearing loss worse on the right.  However, the patient reports that after several recent trips and what sounds like acute viral upper respiratory infection she noted sudden onset worsening of left-sided aural fullness.  She presented with an updated audiogram that demonstrates a sudden drop in the left-sided hearing.     The etiologies of sudden sensorineural hearing loss were previously discussed with the patient.  She has recent MRI imaging that demonstrates no concerning lesions.  She at first trialed a weeklong course of high-dose steroid with subjective improvement initially in symptoms.  However, repeat audiogram demonstrates persistent drop in sensorineural hearing loss on the left.  After our prior discussion the patient was interested in salvage intratympanic steroid injections.  The second of these injections was performed at today's visit.    She will follow-up in 1 week for last of planned injections.    This note was created using speech recognition transcription software. Despite proofreading, typographical errors may be present that affect the meaning of the content. Please contact my office with any questions.

## 2023-11-15 ENCOUNTER — OFFICE VISIT (OUTPATIENT)
Dept: OTOLARYNGOLOGY | Facility: CLINIC | Age: 68
End: 2023-11-15
Payer: MEDICARE

## 2023-11-15 VITALS
WEIGHT: 154 LBS | TEMPERATURE: 98.1 F | HEART RATE: 58 BPM | RESPIRATION RATE: 16 BRPM | OXYGEN SATURATION: 99 % | BODY MASS INDEX: 25.66 KG/M2 | SYSTOLIC BLOOD PRESSURE: 121 MMHG | HEIGHT: 65 IN | DIASTOLIC BLOOD PRESSURE: 69 MMHG

## 2023-11-15 DIAGNOSIS — H90.3 ASYMMETRICAL SENSORINEURAL HEARING LOSS: ICD-10-CM

## 2023-11-15 DIAGNOSIS — H91.20 SUDDEN-ONSET SENSORINEURAL HEARING LOSS: Primary | ICD-10-CM

## 2023-11-15 PROCEDURE — 3074F SYST BP LT 130 MM HG: CPT | Performed by: STUDENT IN AN ORGANIZED HEALTH CARE EDUCATION/TRAINING PROGRAM

## 2023-11-15 PROCEDURE — 1159F MED LIST DOCD IN RCRD: CPT | Performed by: STUDENT IN AN ORGANIZED HEALTH CARE EDUCATION/TRAINING PROGRAM

## 2023-11-15 PROCEDURE — 1160F RVW MEDS BY RX/DR IN RCRD: CPT | Performed by: STUDENT IN AN ORGANIZED HEALTH CARE EDUCATION/TRAINING PROGRAM

## 2023-11-15 PROCEDURE — 1036F TOBACCO NON-USER: CPT | Performed by: STUDENT IN AN ORGANIZED HEALTH CARE EDUCATION/TRAINING PROGRAM

## 2023-11-15 PROCEDURE — 69801 INCISE INNER EAR: CPT | Performed by: STUDENT IN AN ORGANIZED HEALTH CARE EDUCATION/TRAINING PROGRAM

## 2023-11-15 PROCEDURE — 99212 OFFICE O/P EST SF 10 MIN: CPT | Performed by: STUDENT IN AN ORGANIZED HEALTH CARE EDUCATION/TRAINING PROGRAM

## 2023-11-15 PROCEDURE — 3078F DIAST BP <80 MM HG: CPT | Performed by: STUDENT IN AN ORGANIZED HEALTH CARE EDUCATION/TRAINING PROGRAM

## 2023-11-15 PROCEDURE — 1126F AMNT PAIN NOTED NONE PRSNT: CPT | Performed by: STUDENT IN AN ORGANIZED HEALTH CARE EDUCATION/TRAINING PROGRAM

## 2023-11-15 RX ORDER — DEXAMETHASONE SODIUM PHOSPHATE 100 MG/10ML
10 INJECTION INTRAMUSCULAR; INTRAVENOUS ONCE
Status: SHIPPED | OUTPATIENT
Start: 2023-11-15

## 2023-11-15 ASSESSMENT — ENCOUNTER SYMPTOMS
LOSS OF SENSATION IN FEET: 0
OCCASIONAL FEELINGS OF UNSTEADINESS: 0
DEPRESSION: 0

## 2023-11-15 ASSESSMENT — PATIENT HEALTH QUESTIONNAIRE - PHQ9
2. FEELING DOWN, DEPRESSED OR HOPELESS: NOT AT ALL
1. LITTLE INTEREST OR PLEASURE IN DOING THINGS: NOT AT ALL
SUM OF ALL RESPONSES TO PHQ9 QUESTIONS 1 AND 2: 0

## 2023-11-15 ASSESSMENT — PAIN SCALES - GENERAL: PAINLEVEL: 0-NO PAIN

## 2023-11-15 NOTE — PROGRESS NOTES
Subjective   Patient ID: Charlotte Phan is a 68 y.o. female who presents for injection  History: 67 year-old female self-referred for evaluation of multiple concerns. She presents today with her  who will also be seen.    The patient reports that she has started noticing that the right ear has increased hearing loss. No recent audiogram. She is noting occasional episodes of vertigo. Sometimes episodes have lasted 1-12 hours. Gets intermittent tinnitus but no aural fullness. MRI brain demonstrated some chronic hematoma. No brain lesion per her report. She denies otalgia, otorrhea but does get itching. She denies a history of prior ear surgery, exposure to ototoxic drugs or agents. 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    She is also noting frequent sinus infections. She notes she will get an infection roughly once a year. Otherwise notes some mild nasal congestion at night no other nasal concerns.    She is also noting snoring; somewhat positional. No witnessed apneas. Has some daytime fatigue but this is somewhat attributed to poor sleep hygiene.    Update 5/8/2023:  Follow-up for hearing concerns.  - Returns today with an audiogram.  - Notes that nasal breathing is improved with Flonase most days. However, still snoring.     Update 10/12/2023:  Follow-up for new hearing issue. Reports that after a flight two weeks ago she noted sudden change in hearing on the right. This was during what sounds like a viral URI and acute exacerbation of ETD as well. She was prescribed an antibiotic which helped some of her symptoms but she has noted persistent aural fullness.    Update 10/24/2023:  Follow-up for sudden sensorineural hearing loss.  Patient reports that initially when starting high-dose steroid she appreciated some improvement in left-sided aural fullness.  However, she now reports that this seems to have returned.  Here today with new  audiogram.    Update 10/31/2023:  Follow-up for sudden sensorineural hearing loss.  After last visit patient had a conversation with the family and opted for salvage intratympanic steroid injections.  Also noting some right-sided aural fullness today.    Update 11/7/2023:  Follow-up for sudden sensorineural hearing loss.  Patient has not appreciated significant change in hearing since first injection.  Continues to note some right-sided aural fullness.  Here today for second injection.    Update 11/15/2023:  Follow-up for sudden sensorineural hearing loss.  Occasionally noting some crackling sound on the left.  Otherwise has not appreciated improvement in hearing.  Notes persistent right-sided aural fullness.  Here today for last of planned injections.    Objective   Physical Exam  CONSTITUTIONAL: Well appearing female who appears stated age.  PSYCHIATRIC: Alert, appropriate mood and affect.  RESPIRATORY: Normal inspiration and expiration and chest wall expansion; no use of accessory muscles to breathe.  VOICE: Clear speech without hoarseness. No stridor nor stertor.  HEAD AND FACE: Symmetric facial features. No cutaneous masses or lesions were visualized.  RIGHT EAR:  Normal external ear and post auricular area, no visible lesions, external auditory canal clear, tympanic membrane intact, no retraction, no signs of mass, effusion, or infection within the middle ear.  LEFT EAR: Normal external ear and post auricular area, no visible lesions, external auditory canal patent, tympanic membrane intact with prior injection site healed, no retraction, no signs of mass, effusion, or infection within the middle ear.     --------------------------------------------------  Procedure: Intratympanic injection, left  Indication: Sudden sensorineural hearing loss, left  Anesthesia: Topical phenol  Informed consent: Verbal consent was obtained following a discussion of the indications and risks of intratympanic steroid  injection.    Procedure:  The patient was positioned, an appropriately sized ear speculum was introduced and the binocular microscope was brought into the field. Dexamethasone at 10 mg/mL was injected into the middle ear. Approximately 0.3 mL was injected. The patient was then left to rest in a supine position for 15 minutes. The procedure was well tolerated, there were no complications.  --------------------------------------------------    Assessment/Plan   Problem List Items Addressed This Visit             ICD-10-CM    Sudden-onset sensorineural hearing loss - Primary H91.20    Asymmetrical sensorineural hearing loss H90.3     68 y.o. female  presenting with sudden sensorineural hearing loss in the setting of prior sensorineural hearing loss.    1.  Sudden sensorineural hearing loss, bilateral sensorineural hearing loss  The patient has known bilateral sensorineural hearing loss worse on the right.  However, the patient reports that after several recent trips and what sounds like acute viral upper respiratory infection she noted sudden onset worsening of left-sided aural fullness.  She presented with an updated audiogram that demonstrates a sudden drop in the left-sided hearing.     The etiologies of sudden sensorineural hearing loss were previously discussed with the patient.  She has recent MRI imaging that demonstrates no concerning lesions.  She first trialed a weeklong course of high-dose steroid with subjective improvement initially in symptoms.  However, repeat audiogram demonstrates persistent drop in sensorineural hearing loss on the left.  After our prior discussion the patient was interested in salvage intratympanic steroid injections.  The last of these injections was performed at today's visit.    She will follow-up in 2 weeks with follow-up audiogram    This note was created using speech recognition transcription software. Despite proofreading, typographical errors may be present that affect the  meaning of the content. Please contact my office with any questions.

## 2023-11-30 ENCOUNTER — CLINICAL SUPPORT (OUTPATIENT)
Dept: AUDIOLOGY | Facility: CLINIC | Age: 68
End: 2023-11-30
Payer: MEDICARE

## 2023-11-30 ENCOUNTER — OFFICE VISIT (OUTPATIENT)
Dept: OTOLARYNGOLOGY | Facility: CLINIC | Age: 68
End: 2023-11-30
Payer: MEDICARE

## 2023-11-30 VITALS
HEIGHT: 64 IN | SYSTOLIC BLOOD PRESSURE: 128 MMHG | OXYGEN SATURATION: 96 % | BODY MASS INDEX: 25.95 KG/M2 | RESPIRATION RATE: 16 BRPM | HEART RATE: 67 BPM | WEIGHT: 152 LBS | TEMPERATURE: 98.2 F | DIASTOLIC BLOOD PRESSURE: 74 MMHG

## 2023-11-30 DIAGNOSIS — H91.20 SUDDEN-ONSET SENSORINEURAL HEARING LOSS: Primary | ICD-10-CM

## 2023-11-30 DIAGNOSIS — H90.3 ASYMMETRICAL SENSORINEURAL HEARING LOSS: ICD-10-CM

## 2023-11-30 DIAGNOSIS — H90.3 ASYMMETRICAL SENSORINEURAL HEARING LOSS: Primary | ICD-10-CM

## 2023-11-30 PROBLEM — H61.22 IMPACTED CERUMEN OF LEFT EAR: Status: RESOLVED | Noted: 2023-08-09 | Resolved: 2023-11-30

## 2023-11-30 PROCEDURE — 1126F AMNT PAIN NOTED NONE PRSNT: CPT | Performed by: STUDENT IN AN ORGANIZED HEALTH CARE EDUCATION/TRAINING PROGRAM

## 2023-11-30 PROCEDURE — 1159F MED LIST DOCD IN RCRD: CPT | Performed by: STUDENT IN AN ORGANIZED HEALTH CARE EDUCATION/TRAINING PROGRAM

## 2023-11-30 PROCEDURE — 1036F TOBACCO NON-USER: CPT | Performed by: STUDENT IN AN ORGANIZED HEALTH CARE EDUCATION/TRAINING PROGRAM

## 2023-11-30 PROCEDURE — 3078F DIAST BP <80 MM HG: CPT | Performed by: STUDENT IN AN ORGANIZED HEALTH CARE EDUCATION/TRAINING PROGRAM

## 2023-11-30 PROCEDURE — 92557 COMPREHENSIVE HEARING TEST: CPT | Performed by: AUDIOLOGIST

## 2023-11-30 PROCEDURE — 1160F RVW MEDS BY RX/DR IN RCRD: CPT | Performed by: STUDENT IN AN ORGANIZED HEALTH CARE EDUCATION/TRAINING PROGRAM

## 2023-11-30 PROCEDURE — 3074F SYST BP LT 130 MM HG: CPT | Performed by: STUDENT IN AN ORGANIZED HEALTH CARE EDUCATION/TRAINING PROGRAM

## 2023-11-30 PROCEDURE — 92550 TYMPANOMETRY & REFLEX THRESH: CPT | Performed by: AUDIOLOGIST

## 2023-11-30 PROCEDURE — 99213 OFFICE O/P EST LOW 20 MIN: CPT | Performed by: STUDENT IN AN ORGANIZED HEALTH CARE EDUCATION/TRAINING PROGRAM

## 2023-11-30 ASSESSMENT — PATIENT HEALTH QUESTIONNAIRE - PHQ9
1. LITTLE INTEREST OR PLEASURE IN DOING THINGS: NOT AT ALL
2. FEELING DOWN, DEPRESSED OR HOPELESS: NOT AT ALL
SUM OF ALL RESPONSES TO PHQ9 QUESTIONS 1 AND 2: 0

## 2023-11-30 ASSESSMENT — PAIN SCALES - GENERAL: PAINLEVEL: 0-NO PAIN

## 2023-11-30 NOTE — PROGRESS NOTES
"AUDIOLOGY ADULT AUDIOMETRIC EVALUATION      Name:  Charlotte Phan  :  1955  Age:  68 y.o.  Date of Evaluation:  2023    HISTORY  Reason for visit:  hearing loss  Ms. Phan is seen 2023 at the request of Shaquille Torres M.D. for an evaluation of hearing.      Chief complaint:    Sudden hearing loss    History of hearing loss, left worse than right (documented 23)  Occasional musical tinnitus  Dizziness/vertigo    - Around the end of September,  she experienced bilateral ear pressure after an airplane flight and was, treated for infection; fullness improved however Left hearing did not return to bseline  - audiogram of 10/12/23 showed worsening of hearing for 2-8kHz compared to 2023  - treated with oral steroids around mid-October;     - patient reported initial improvement in L hearing with oral steroids but then worsening again;   - she has not noticed a change in hearing since previous assessment of 10/12/2023  She has been treated with intratympanic steroids (10/31, , 11/15)    Hearing loss:  no change; reports hearing difficulty for conversation  Tinnitus:    intermittent tinnitus, bilateral; intermittent over maybe 5 years  Otitis Media: no history  Other otologic surgical history:  denies  Dizziness/imbalance:  followed by primary for vertigo; controlled  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    RESULTS   Otoscopic Evaluation: clear canals bilaterally      Immittance Measures (226 Hz probe tone):       Right Ear: Tympanometry is consistent with normal middle ear pressure and normal tympanic membrane mobility       Left Ear: Tympanometry is consistent with normal middle ear pressure and normal tympanic membrane mobility    Test technique:  standard behavioral " technique via insert earphones     Reliability:   good    Pure Tone Audiometry:  Hearing sensitivity is in the normal hearing to moderate sensorineural hearing loss range bilaterally.  Note asymmetry for 1500 Hz (right worse than left)    Speech Audiometry:        Right Ear:  Speech Reception Threshold (SRT) was obtained at 30 dBHL                 Speech discrimination score was 88% in quiet when words were presented at 80 dBHL      Left Ear:  Speech Reception Threshold (SRT) was obtained at 20 dBHL                 Speech discrimination score was 96% in quiet when words were presented at 70 dBHL    IMPRESSIONS:  Patient is expected to have communication difficulty in adverse listening environments.    Patient is expected to benefit from devices that provide amplification (e.g., hearing aids) and improve the desired sound signal over that of background noise.      RECOMMENDATIONS  Continue with otologic follow-up with Carlos Eduardo Wilson M.D.   Reassess hearing in 1 year (or sooner if medically indicated or if there is a concern for a change in hearing).     Hearing Aid Evaluation with an audiologist to discuss hearing technology (such as hearing aids) and services (pending medical management and medical clearance).    Continue with medical follow-up as indicated.       PATIENT EDUCATION  Discussed results and recommendations with patient.  Questions were addressed and the patient was encouraged to contact our department should concerns arise.       JUSTIN Condon, [facility]  Licensed Audiologist

## 2023-11-30 NOTE — PROGRESS NOTES
Subjective   Patient ID: Charlotte Phan is a 68 y.o. female who presents for Hearing Loss  History: 67 year-old female self-referred for evaluation of multiple concerns. She presents today with her  who will also be seen.    The patient reports that she has started noticing that the right ear has increased hearing loss. No recent audiogram. She is noting occasional episodes of vertigo. Sometimes episodes have lasted 1-12 hours. Gets intermittent tinnitus but no aural fullness. MRI brain demonstrated some chronic hematoma. No brain lesion per her report. She denies otalgia, otorrhea but does get itching. She denies a history of prior ear surgery, exposure to ototoxic drugs or agents. 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    She is also noting frequent sinus infections. She notes she will get an infection roughly once a year. Otherwise notes some mild nasal congestion at night no other nasal concerns.    She is also noting snoring; somewhat positional. No witnessed apneas. Has some daytime fatigue but this is somewhat attributed to poor sleep hygiene.    Update 5/8/2023:  Follow-up for hearing concerns.  - Returns today with an audiogram.  - Notes that nasal breathing is improved with Flonase most days. However, still snoring.     Update 10/12/2023:  Follow-up for new hearing issue. Reports that after a flight two weeks ago she noted sudden change in hearing on the right. This was during what sounds like a viral URI and acute exacerbation of ETD as well. She was prescribed an antibiotic which helped some of her symptoms but she has noted persistent aural fullness.    Update 10/24/2023:  Follow-up for sudden sensorineural hearing loss.  Patient reports that initially when starting high-dose steroid she appreciated some improvement in left-sided aural fullness.  However, she now reports that this seems to have returned.  Here today with new  audiogram.    Update 10/31/2023:  Follow-up for sudden sensorineural hearing loss.  After last visit patient had a conversation with the family and opted for salvage intratympanic steroid injections.  Also noting some right-sided aural fullness today.    Update 11/7/2023:  Follow-up for sudden sensorineural hearing loss.  Patient has not appreciated significant change in hearing since first injection.  Continues to note some right-sided aural fullness.  Here today for second injection.    Update 11/15/2023:  Follow-up for sudden sensorineural hearing loss.  Occasionally noting some crackling sound on the left.  Otherwise has not appreciated improvement in hearing.  Notes persistent right-sided aural fullness.  Here today for last of planned injections.    Update 11/30/2023:  Follow-up for sudden sensorineural hearing loss.  Has not appreciated significant change in hearing since last visit.  Feels like she may be getting used to hearing change.  Aural fullness on the right has come and gone.    Objective   Physical Exam  CONSTITUTIONAL: Well appearing female who appears stated age.  PSYCHIATRIC: Alert, appropriate mood and affect.  RESPIRATORY: Normal inspiration and expiration and chest wall expansion; no use of accessory muscles to breathe.  VOICE: Clear speech without hoarseness. No stridor nor stertor.  HEAD AND FACE: Symmetric facial features. No cutaneous masses or lesions were visualized.  RIGHT EAR:  Normal external ear and post auricular area, no visible lesions, external auditory canal clear, tympanic membrane intact, no retraction, no signs of mass, effusion, or infection within the middle ear.  LEFT EAR: Normal external ear and post auricular area, no visible lesions, external auditory canal patent, tympanic membrane intact with prior injection site healed, no retraction, no signs of mass, effusion, or infection within the middle ear.    --------------------------------------------------  Audiology:  I  personally reviewed the patient's audiogram from today.  This demonstrated relatively stable asymmetric sensorineural hearing loss.  She has a bilateral normal sloping to moderate sensorineural hearing loss with the right ear less worse than the left at 1000, 2000, and 4000 Hz.  She had 88% word recognition on the right and 96% on the left with amplification.  Type a tympanograms bilaterally.  --------------------------------------------------    Assessment/Plan  Problem List Items Addressed This Visit             ICD-10-CM    Sudden-onset sensorineural hearing loss - Primary H91.20    Asymmetrical sensorineural hearing loss H90.3     68 y.o. female presenting with sudden sensorineural hearing loss in the setting of prior sensorineural hearing loss.    1.  Sudden sensorineural hearing loss, bilateral sensorineural hearing loss  The patient has known bilateral sensorineural hearing loss worse on the right.  However, the patient reports that after several recent trips and what sounds like acute viral upper respiratory infection she noted sudden onset worsening of left-sided aural fullness.  She presented with an updated audiogram that demonstrates a sudden drop in the left-sided hearing.     The etiologies of sudden sensorineural hearing loss were previously discussed with the patient.  She has recent MRI imaging that demonstrates no concerning lesions.  She first trialed a weeklong course of high-dose steroid with subjective improvement initially in symptoms.  However, repeat audiogram demonstrates persistent drop in sensorineural hearing loss on the left.  She is now status post salvage intratympanic steroid injections with repeat audiogram.  Unfortunately this seems to suggest persistent drop in left-sided hearing although there is some questionable improvement that is within the range or error.    For now I recommended no further interventions and recommended instead that the patient continue to observe.  She is  not interested in amplification at this time.  I recommended follow-up audiogram and visit with me in 6 to 12 months to check on stability.    This note was created using speech recognition transcription software. Despite proofreading, typographical errors may be present that affect the meaning of the content. Please contact my office with any questions.

## 2024-01-08 DIAGNOSIS — E79.0 HYPERURICEMIA: ICD-10-CM

## 2024-01-08 DIAGNOSIS — Z00.00 ROUTINE GENERAL MEDICAL EXAMINATION AT A HEALTH CARE FACILITY: ICD-10-CM

## 2024-01-09 ENCOUNTER — LAB (OUTPATIENT)
Dept: LAB | Facility: LAB | Age: 69
End: 2024-01-09
Payer: MEDICARE

## 2024-01-09 DIAGNOSIS — E79.0 HYPERURICEMIA: ICD-10-CM

## 2024-01-09 DIAGNOSIS — Z00.00 ROUTINE GENERAL MEDICAL EXAMINATION AT A HEALTH CARE FACILITY: ICD-10-CM

## 2024-01-09 LAB
ALBUMIN SERPL BCP-MCNC: 4 G/DL (ref 3.4–5)
ALP SERPL-CCNC: 68 U/L (ref 33–136)
ALT SERPL W P-5'-P-CCNC: 14 U/L (ref 7–45)
ANION GAP SERPL CALC-SCNC: 11 MMOL/L (ref 10–20)
AST SERPL W P-5'-P-CCNC: 19 U/L (ref 9–39)
BASOPHILS # BLD AUTO: 0.05 X10*3/UL (ref 0–0.1)
BASOPHILS NFR BLD AUTO: 1 %
BILIRUB SERPL-MCNC: 0.4 MG/DL (ref 0–1.2)
BUN SERPL-MCNC: 18 MG/DL (ref 6–23)
CALCIUM SERPL-MCNC: 8.6 MG/DL (ref 8.6–10.3)
CHLORIDE SERPL-SCNC: 104 MMOL/L (ref 98–107)
CHOLEST SERPL-MCNC: 291 MG/DL (ref 0–199)
CHOLESTEROL/HDL RATIO: 4.8
CO2 SERPL-SCNC: 28 MMOL/L (ref 21–32)
CREAT SERPL-MCNC: 0.88 MG/DL (ref 0.5–1.05)
EGFRCR SERPLBLD CKD-EPI 2021: 72 ML/MIN/1.73M*2
EOSINOPHIL # BLD AUTO: 0.07 X10*3/UL (ref 0–0.7)
EOSINOPHIL NFR BLD AUTO: 1.4 %
ERYTHROCYTE [DISTWIDTH] IN BLOOD BY AUTOMATED COUNT: 13.1 % (ref 11.5–14.5)
GLUCOSE SERPL-MCNC: 101 MG/DL (ref 74–99)
HCT VFR BLD AUTO: 44.3 % (ref 36–46)
HDLC SERPL-MCNC: 60.5 MG/DL
HGB BLD-MCNC: 14.5 G/DL (ref 12–16)
IMM GRANULOCYTES # BLD AUTO: 0.01 X10*3/UL (ref 0–0.7)
IMM GRANULOCYTES NFR BLD AUTO: 0.2 % (ref 0–0.9)
LDLC SERPL CALC-MCNC: 201 MG/DL
LYMPHOCYTES # BLD AUTO: 1.79 X10*3/UL (ref 1.2–4.8)
LYMPHOCYTES NFR BLD AUTO: 36.8 %
MCH RBC QN AUTO: 28.6 PG (ref 26–34)
MCHC RBC AUTO-ENTMCNC: 32.7 G/DL (ref 32–36)
MCV RBC AUTO: 87 FL (ref 80–100)
MONOCYTES # BLD AUTO: 0.37 X10*3/UL (ref 0.1–1)
MONOCYTES NFR BLD AUTO: 7.6 %
NEUTROPHILS # BLD AUTO: 2.57 X10*3/UL (ref 1.2–7.7)
NEUTROPHILS NFR BLD AUTO: 53 %
NON HDL CHOLESTEROL: 231 MG/DL (ref 0–149)
NRBC BLD-RTO: 0 /100 WBCS (ref 0–0)
PLATELET # BLD AUTO: 193 X10*3/UL (ref 150–450)
POTASSIUM SERPL-SCNC: 4.4 MMOL/L (ref 3.5–5.3)
PROT SERPL-MCNC: 6.5 G/DL (ref 6.4–8.2)
RBC # BLD AUTO: 5.07 X10*6/UL (ref 4–5.2)
SODIUM SERPL-SCNC: 139 MMOL/L (ref 136–145)
TRIGL SERPL-MCNC: 146 MG/DL (ref 0–149)
TSH SERPL-ACNC: 3.61 MIU/L (ref 0.44–3.98)
VLDL: 29 MG/DL (ref 0–40)
WBC # BLD AUTO: 4.9 X10*3/UL (ref 4.4–11.3)

## 2024-01-09 PROCEDURE — 85025 COMPLETE CBC W/AUTO DIFF WBC: CPT

## 2024-01-09 PROCEDURE — 80053 COMPREHEN METABOLIC PANEL: CPT

## 2024-01-09 PROCEDURE — 36415 COLL VENOUS BLD VENIPUNCTURE: CPT

## 2024-01-09 PROCEDURE — 80061 LIPID PANEL: CPT

## 2024-01-09 PROCEDURE — 84443 ASSAY THYROID STIM HORMONE: CPT

## 2024-01-15 ENCOUNTER — TELEPHONE (OUTPATIENT)
Dept: PRIMARY CARE | Facility: CLINIC | Age: 69
End: 2024-01-15
Payer: MEDICARE

## 2024-01-15 NOTE — TELEPHONE ENCOUNTER
----- Message from Harvey Parks MD sent at 1/10/2024  8:45 PM EST -----  Regarding: Labs  Elevated Cholesterol levels- I would like her to see Dr Conor Hernandez in [facility] to review treatment options for elevated Lipids-please call [phone] to set up an appointment    ----- Message -----  From: Lab, Background User  Sent: 1/9/2024   9:00 AM EST  To: Harvey Parks MD

## 2024-01-15 NOTE — TELEPHONE ENCOUNTER
Spoke to patient and informed her or rmb message. She was informed how to set up mychart and would like her results sent thru mychart.       Ariane Murillo MA

## 2024-04-09 DIAGNOSIS — I10 PRIMARY HYPERTENSION: ICD-10-CM

## 2024-04-10 RX ORDER — AMLODIPINE BESYLATE 2.5 MG/1
2.5 TABLET ORAL DAILY
Qty: 90 TABLET | Refills: 3 | Status: SHIPPED | OUTPATIENT
Start: 2024-04-10 | End: 2025-04-10

## 2024-05-13 ENCOUNTER — APPOINTMENT (OUTPATIENT)
Dept: AUDIOLOGY | Facility: CLINIC | Age: 69
End: 2024-05-13
Payer: MEDICARE

## 2024-05-13 ENCOUNTER — APPOINTMENT (OUTPATIENT)
Dept: OTOLARYNGOLOGY | Facility: CLINIC | Age: 69
End: 2024-05-13
Payer: MEDICARE

## 2024-07-03 ENCOUNTER — APPOINTMENT (OUTPATIENT)
Dept: PRIMARY CARE | Facility: CLINIC | Age: 69
End: 2024-07-03
Payer: MEDICARE

## 2024-07-03 VITALS
HEART RATE: 60 BPM | WEIGHT: 157.4 LBS | BODY MASS INDEX: 27.02 KG/M2 | RESPIRATION RATE: 16 BRPM | OXYGEN SATURATION: 96 %

## 2024-07-03 DIAGNOSIS — J02.9 PHARYNGITIS, UNSPECIFIED ETIOLOGY: ICD-10-CM

## 2024-07-03 DIAGNOSIS — H10.33 ACUTE CONJUNCTIVITIS OF BOTH EYES, UNSPECIFIED ACUTE CONJUNCTIVITIS TYPE: Primary | ICD-10-CM

## 2024-07-03 PROCEDURE — 1159F MED LIST DOCD IN RCRD: CPT | Performed by: INTERNAL MEDICINE

## 2024-07-03 PROCEDURE — 99213 OFFICE O/P EST LOW 20 MIN: CPT | Performed by: INTERNAL MEDICINE

## 2024-07-03 RX ORDER — AZITHROMYCIN 250 MG/1
TABLET, FILM COATED ORAL DAILY
Qty: 6 TABLET | Refills: 0 | Status: SHIPPED | OUTPATIENT
Start: 2024-07-03 | End: 2024-07-08

## 2024-07-03 RX ORDER — TOBRAMYCIN AND DEXAMETHASONE 3; 1 MG/ML; MG/ML
1 SUSPENSION/ DROPS OPHTHALMIC
Qty: 5 ML | Refills: 1 | Status: SHIPPED | OUTPATIENT
Start: 2024-07-03 | End: 2024-07-13

## 2024-07-03 ASSESSMENT — ENCOUNTER SYMPTOMS
DEPRESSION: 0
OCCASIONAL FEELINGS OF UNSTEADINESS: 0
LOSS OF SENSATION IN FEET: 0

## 2024-07-03 NOTE — PROGRESS NOTES
Subjective   Patient ID: Charlotte Phan is a 68 y.o. female who presents for Follow-up, Back Pain, and Conjunctivitis.    Red eyes x 2 days    ST    Lower back pain    HPI     Review of Systems      No Fever/chills/headaches/dizziness/chest pains/ cough/ shortness of breath/palpitations/ abdominal pain /Nausea/vomiting/diarrhea/ constipation/urine frequency/blood in urine.      Objective   Pulse 60   Resp 16   Wt 71.4 kg (157 lb 6.4 oz)   SpO2 96%   BMI 27.02 kg/m²     Physical Exam    No JVP elevation. No palpable Lymph Nodes. No Thyromegaly    HEENT- B/L Eye redness    CVS-NL S1/S2 . No MRG    Lungs-CTA. B/S= B/L    Abdomen-Soft, Non-tender. No masses or HSM    Extremities: No C/C/E    Skin-No abnormal moles/rash        Assessment/Plan        Red eyes- ? Viral vs Bacterial conjunctivitis     URI-Zpack    Lower back pain    Continue with current Rx     Follow up 3 months/PRN

## 2024-08-16 PROBLEM — Z86.39 HISTORY OF THYROID DISORDER: Status: ACTIVE | Noted: 2024-08-16

## 2024-08-16 PROBLEM — Z96.1 PSEUDOPHAKIA OF BOTH EYES: Status: ACTIVE | Noted: 2024-05-17

## 2024-08-16 PROBLEM — H52.7 REFRACTIVE ERROR: Status: ACTIVE | Noted: 2024-01-19

## 2024-08-16 PROBLEM — M19.049 OSTEOARTHRITIS OF HAND: Status: ACTIVE | Noted: 2024-08-16

## 2024-08-16 PROBLEM — H43.811 PVD (POSTERIOR VITREOUS DETACHMENT), RIGHT EYE: Status: ACTIVE | Noted: 2024-01-19

## 2024-08-16 PROBLEM — R06.83 SNORING: Status: ACTIVE | Noted: 2024-08-16

## 2024-08-16 PROBLEM — H02.403 PTOSIS OF EYELID, BILATERAL: Status: ACTIVE | Noted: 2024-01-19

## 2024-08-16 PROBLEM — G43.909 MIGRAINE HEADACHE: Status: ACTIVE | Noted: 2024-08-16

## 2024-08-16 PROBLEM — H35.373 EPIRETINAL MEMBRANE (ERM) OF BOTH EYES: Status: ACTIVE | Noted: 2024-01-22

## 2024-08-16 PROBLEM — R09.81 NASAL CONGESTION: Status: ACTIVE | Noted: 2024-08-16

## 2024-08-16 PROBLEM — Z86.79 HISTORY OF HYPERTENSION: Status: ACTIVE | Noted: 2024-08-16

## 2024-08-21 ENCOUNTER — APPOINTMENT (OUTPATIENT)
Dept: PRIMARY CARE | Facility: CLINIC | Age: 69
End: 2024-08-21
Payer: MEDICARE

## 2024-08-21 VITALS
WEIGHT: 155.4 LBS | RESPIRATION RATE: 16 BRPM | OXYGEN SATURATION: 99 % | BODY MASS INDEX: 26.53 KG/M2 | HEIGHT: 64 IN | HEART RATE: 62 BPM

## 2024-08-21 DIAGNOSIS — Z00.00 MEDICARE ANNUAL WELLNESS VISIT, SUBSEQUENT: Primary | ICD-10-CM

## 2024-08-21 DIAGNOSIS — E03.9 HYPOTHYROIDISM, UNSPECIFIED TYPE: ICD-10-CM

## 2024-08-21 DIAGNOSIS — E78.5 HYPERLIPIDEMIA, UNSPECIFIED HYPERLIPIDEMIA TYPE: ICD-10-CM

## 2024-08-21 DIAGNOSIS — E55.9 VITAMIN D DEFICIENCY: ICD-10-CM

## 2024-08-21 DIAGNOSIS — Z13.6 SCREENING FOR CARDIOVASCULAR CONDITION: ICD-10-CM

## 2024-08-21 DIAGNOSIS — R07.9 CHEST PAIN, UNSPECIFIED TYPE: ICD-10-CM

## 2024-08-21 PROCEDURE — 1170F FXNL STATUS ASSESSED: CPT | Performed by: INTERNAL MEDICINE

## 2024-08-21 PROCEDURE — 1159F MED LIST DOCD IN RCRD: CPT | Performed by: INTERNAL MEDICINE

## 2024-08-21 PROCEDURE — 3008F BODY MASS INDEX DOCD: CPT | Performed by: INTERNAL MEDICINE

## 2024-08-21 PROCEDURE — 1036F TOBACCO NON-USER: CPT | Performed by: INTERNAL MEDICINE

## 2024-08-21 PROCEDURE — G0439 PPPS, SUBSEQ VISIT: HCPCS | Performed by: INTERNAL MEDICINE

## 2024-08-21 RX ORDER — METHYLPREDNISOLONE 4 MG/1
TABLET ORAL
COMMUNITY
Start: 2024-06-28

## 2024-08-21 RX ORDER — UREA 10 %
LOTION (ML) TOPICAL
COMMUNITY
Start: 2024-08-15

## 2024-08-21 ASSESSMENT — ENCOUNTER SYMPTOMS
LOSS OF SENSATION IN FEET: 0
DEPRESSION: 0
OCCASIONAL FEELINGS OF UNSTEADINESS: 0

## 2024-08-21 ASSESSMENT — PATIENT HEALTH QUESTIONNAIRE - PHQ9
1. LITTLE INTEREST OR PLEASURE IN DOING THINGS: NOT AT ALL
SUM OF ALL RESPONSES TO PHQ9 QUESTIONS 1 AND 2: 0
1. LITTLE INTEREST OR PLEASURE IN DOING THINGS: NOT AT ALL
2. FEELING DOWN, DEPRESSED OR HOPELESS: NOT AT ALL
SUM OF ALL RESPONSES TO PHQ9 QUESTIONS 1 AND 2: 0
2. FEELING DOWN, DEPRESSED OR HOPELESS: NOT AT ALL

## 2024-08-21 ASSESSMENT — ACTIVITIES OF DAILY LIVING (ADL)
BATHING: INDEPENDENT
DRESSING: INDEPENDENT
GROCERY_SHOPPING: INDEPENDENT
TAKING_MEDICATION: INDEPENDENT
JUDGMENT_ADEQUATE_SAFELY_COMPLETE_DAILY_ACTIVITIES: YES
TOILETING: INDEPENDENT
BATHING: INDEPENDENT
DOING_HOUSEWORK: INDEPENDENT
HEARING - LEFT EAR: FUNCTIONAL
WALKS IN HOME: INDEPENDENT
DRESSING YOURSELF: INDEPENDENT
HEARING - RIGHT EAR: DIFFICULTY WITH NOISE
ADEQUATE_TO_COMPLETE_ADL: YES
GROOMING: INDEPENDENT
PATIENT'S MEMORY ADEQUATE TO SAFELY COMPLETE DAILY ACTIVITIES?: YES
MANAGING_FINANCES: INDEPENDENT
FEEDING YOURSELF: INDEPENDENT

## 2024-08-21 NOTE — PROGRESS NOTES
"Subjective   Reason for Visit: Charlotte Phan is an 69 y.o. female here for a Medicare Wellness visit.      HTN    HLD    Hypothyroid        Reviewed all medications by prescribing practitioner or clinical pharmacist (such as prescriptions, OTCs, herbal therapies and supplements) and documented in the medical record.    HPI    Patient Care Team:  Harvey Parks MD as PCP - General  Har[facility] Parks MD [facility] P[facility]     Review of Systems    Neck strain    PND    Cough    No Fever/chills/headaches/dizziness/chest pains/ / shortness of breath/palpitations/ abdominal pain /Nausea/vomiting/diarrhea/ constipation/urine frequency/blood in urine.      Objective   Vitals:  Pulse 62   Resp 16   Ht 1.615 m (5' 3.58\")   Wt 70.5 kg (155 lb 6.4 oz)   SpO2 99%   BMI 27.03 kg/m²       Physical Exam    No JVP elevation. No palpable Lymph Nodes. No Thyromegaly    HEENT- Negative    CVS-NL S1/S2 . No MRG    Lungs-CTA. B/S= B/L    Abdomen-Soft, Non-tender. No masses or HSM    Extremities: No C/C/E    Skin-No abnormal moles/rash        Assessment/Plan   Problem List Items Addressed This Visit             ICD-10-CM    Hyperlipidemia E78.5    Relevant Orders    Lipid Panel    Hypothyroidism E03.9    Relevant Orders    TSH with reflex to Free T4 if abnormal    Chest pain R07.9    Relevant Orders    CBC and Auto Differential    Vitamin D deficiency E55.9    Relevant Orders    Vitamin D 25-Hydroxy,Total (for eval of Vitamin D levels)     Other Visit Diagnoses         Codes    Screening for cardiovascular condition     Z13.6    Relevant Orders    Comprehensive Metabolic Panel    Medicare annual wellness visit, subsequent     Z00.00    Relevant Orders    Urinalysis with Reflex Microscopic         HTN- -Goal < 130/80-  Encouraged sodium restriction, DASH or Mediterranean diet  -    HLD    Hypothyroid    PND/Cough- ? Allergic rhinitis    Cologuard-2023    Recommend Prevnar 20/Shingrix Rx    Labs    Continue with " current Rx    Follow up in 12 months /PRN

## 2024-09-03 ENCOUNTER — LAB (OUTPATIENT)
Dept: LAB | Facility: LAB | Age: 69
End: 2024-09-03
Payer: MEDICARE

## 2024-09-03 DIAGNOSIS — E03.9 HYPOTHYROIDISM, UNSPECIFIED TYPE: ICD-10-CM

## 2024-09-03 DIAGNOSIS — E55.9 VITAMIN D DEFICIENCY: ICD-10-CM

## 2024-09-03 DIAGNOSIS — Z00.00 MEDICARE ANNUAL WELLNESS VISIT, SUBSEQUENT: ICD-10-CM

## 2024-09-03 DIAGNOSIS — Z13.6 SCREENING FOR CARDIOVASCULAR CONDITION: ICD-10-CM

## 2024-09-03 DIAGNOSIS — E78.5 HYPERLIPIDEMIA, UNSPECIFIED HYPERLIPIDEMIA TYPE: ICD-10-CM

## 2024-09-03 DIAGNOSIS — R07.9 CHEST PAIN, UNSPECIFIED TYPE: ICD-10-CM

## 2024-09-03 LAB
25(OH)D3 SERPL-MCNC: 29 NG/ML (ref 30–100)
ALBUMIN SERPL BCP-MCNC: 4 G/DL (ref 3.4–5)
ALP SERPL-CCNC: 72 U/L (ref 33–136)
ALT SERPL W P-5'-P-CCNC: 13 U/L (ref 7–45)
ANION GAP SERPL CALC-SCNC: 13 MMOL/L (ref 10–20)
APPEARANCE UR: ABNORMAL
AST SERPL W P-5'-P-CCNC: 17 U/L (ref 9–39)
BASOPHILS # BLD AUTO: 0.04 X10*3/UL (ref 0–0.1)
BASOPHILS NFR BLD AUTO: 0.8 %
BILIRUB SERPL-MCNC: 0.4 MG/DL (ref 0–1.2)
BILIRUB UR STRIP.AUTO-MCNC: NEGATIVE MG/DL
BUN SERPL-MCNC: 20 MG/DL (ref 6–23)
CALCIUM SERPL-MCNC: 8.9 MG/DL (ref 8.6–10.3)
CHLORIDE SERPL-SCNC: 102 MMOL/L (ref 98–107)
CHOLEST SERPL-MCNC: 276 MG/DL (ref 0–199)
CHOLESTEROL/HDL RATIO: 4.3
CO2 SERPL-SCNC: 28 MMOL/L (ref 21–32)
COLOR UR: ABNORMAL
CREAT SERPL-MCNC: 0.79 MG/DL (ref 0.5–1.05)
EGFRCR SERPLBLD CKD-EPI 2021: 81 ML/MIN/1.73M*2
EOSINOPHIL # BLD AUTO: 0.08 X10*3/UL (ref 0–0.7)
EOSINOPHIL NFR BLD AUTO: 1.6 %
ERYTHROCYTE [DISTWIDTH] IN BLOOD BY AUTOMATED COUNT: 12.9 % (ref 11.5–14.5)
GLUCOSE SERPL-MCNC: 98 MG/DL (ref 74–99)
GLUCOSE UR STRIP.AUTO-MCNC: NORMAL MG/DL
HCT VFR BLD AUTO: 43.1 % (ref 36–46)
HDLC SERPL-MCNC: 63.9 MG/DL
HGB BLD-MCNC: 14.3 G/DL (ref 12–16)
IMM GRANULOCYTES # BLD AUTO: 0.01 X10*3/UL (ref 0–0.7)
IMM GRANULOCYTES NFR BLD AUTO: 0.2 % (ref 0–0.9)
KETONES UR STRIP.AUTO-MCNC: NEGATIVE MG/DL
LDLC SERPL CALC-MCNC: 177 MG/DL
LEUKOCYTE ESTERASE UR QL STRIP.AUTO: ABNORMAL
LYMPHOCYTES # BLD AUTO: 1.74 X10*3/UL (ref 1.2–4.8)
LYMPHOCYTES NFR BLD AUTO: 35.4 %
MCH RBC QN AUTO: 29.4 PG (ref 26–34)
MCHC RBC AUTO-ENTMCNC: 33.2 G/DL (ref 32–36)
MCV RBC AUTO: 89 FL (ref 80–100)
MONOCYTES # BLD AUTO: 0.43 X10*3/UL (ref 0.1–1)
MONOCYTES NFR BLD AUTO: 8.8 %
MUCOUS THREADS #/AREA URNS AUTO: ABNORMAL /LPF
NEUTROPHILS # BLD AUTO: 2.61 X10*3/UL (ref 1.2–7.7)
NEUTROPHILS NFR BLD AUTO: 53.2 %
NITRITE UR QL STRIP.AUTO: NEGATIVE
NON HDL CHOLESTEROL: 212 MG/DL (ref 0–149)
NRBC BLD-RTO: 0 /100 WBCS (ref 0–0)
PH UR STRIP.AUTO: 5 [PH]
PLATELET # BLD AUTO: 194 X10*3/UL (ref 150–450)
POTASSIUM SERPL-SCNC: 4.4 MMOL/L (ref 3.5–5.3)
PROT SERPL-MCNC: 6.4 G/DL (ref 6.4–8.2)
PROT UR STRIP.AUTO-MCNC: NEGATIVE MG/DL
RBC # BLD AUTO: 4.87 X10*6/UL (ref 4–5.2)
RBC # UR STRIP.AUTO: NEGATIVE /UL
RBC #/AREA URNS AUTO: ABNORMAL /HPF
SODIUM SERPL-SCNC: 139 MMOL/L (ref 136–145)
SP GR UR STRIP.AUTO: 1.02
SQUAMOUS #/AREA URNS AUTO: ABNORMAL /HPF
TRIGL SERPL-MCNC: 174 MG/DL (ref 0–149)
TSH SERPL-ACNC: 3.82 MIU/L (ref 0.44–3.98)
UROBILINOGEN UR STRIP.AUTO-MCNC: NORMAL MG/DL
VLDL: 35 MG/DL (ref 0–40)
WBC # BLD AUTO: 4.9 X10*3/UL (ref 4.4–11.3)
WBC #/AREA URNS AUTO: ABNORMAL /HPF

## 2024-09-03 PROCEDURE — 81001 URINALYSIS AUTO W/SCOPE: CPT

## 2024-09-03 PROCEDURE — 80053 COMPREHEN METABOLIC PANEL: CPT

## 2024-09-03 PROCEDURE — 84443 ASSAY THYROID STIM HORMONE: CPT

## 2024-09-03 PROCEDURE — 82306 VITAMIN D 25 HYDROXY: CPT

## 2024-09-03 PROCEDURE — 36415 COLL VENOUS BLD VENIPUNCTURE: CPT

## 2024-09-03 PROCEDURE — 85025 COMPLETE CBC W/AUTO DIFF WBC: CPT

## 2024-09-03 PROCEDURE — 80061 LIPID PANEL: CPT

## 2024-10-07 DIAGNOSIS — E78.89 LIPIDS ABNORMAL: ICD-10-CM

## 2024-10-07 RX ORDER — LEVOTHYROXINE SODIUM 75 UG/1
75 TABLET ORAL DAILY
Qty: 90 TABLET | Refills: 1 | Status: SHIPPED | OUTPATIENT
Start: 2024-10-07 | End: 2025-04-05

## 2024-11-13 ENCOUNTER — OFFICE VISIT (OUTPATIENT)
Dept: OTOLARYNGOLOGY | Facility: CLINIC | Age: 69
End: 2024-11-13
Payer: MEDICARE

## 2024-11-13 ENCOUNTER — CLINICAL SUPPORT (OUTPATIENT)
Dept: AUDIOLOGY | Facility: CLINIC | Age: 69
End: 2024-11-13
Payer: MEDICARE

## 2024-11-13 VITALS
HEIGHT: 65 IN | HEART RATE: 58 BPM | TEMPERATURE: 98 F | SYSTOLIC BLOOD PRESSURE: 132 MMHG | WEIGHT: 158 LBS | OXYGEN SATURATION: 96 % | DIASTOLIC BLOOD PRESSURE: 80 MMHG | BODY MASS INDEX: 26.33 KG/M2

## 2024-11-13 DIAGNOSIS — H90.3 SNHL (SENSORY-NEURAL HEARING LOSS), ASYMMETRICAL: Primary | ICD-10-CM

## 2024-11-13 DIAGNOSIS — H90.3 ASYMMETRICAL SENSORINEURAL HEARING LOSS: Primary | ICD-10-CM

## 2024-11-13 PROCEDURE — 1036F TOBACCO NON-USER: CPT | Performed by: STUDENT IN AN ORGANIZED HEALTH CARE EDUCATION/TRAINING PROGRAM

## 2024-11-13 PROCEDURE — 1126F AMNT PAIN NOTED NONE PRSNT: CPT | Performed by: STUDENT IN AN ORGANIZED HEALTH CARE EDUCATION/TRAINING PROGRAM

## 2024-11-13 PROCEDURE — 99417 PROLNG OP E/M EACH 15 MIN: CPT | Performed by: STUDENT IN AN ORGANIZED HEALTH CARE EDUCATION/TRAINING PROGRAM

## 2024-11-13 PROCEDURE — 1160F RVW MEDS BY RX/DR IN RCRD: CPT | Performed by: STUDENT IN AN ORGANIZED HEALTH CARE EDUCATION/TRAINING PROGRAM

## 2024-11-13 PROCEDURE — 3079F DIAST BP 80-89 MM HG: CPT | Performed by: STUDENT IN AN ORGANIZED HEALTH CARE EDUCATION/TRAINING PROGRAM

## 2024-11-13 PROCEDURE — 3008F BODY MASS INDEX DOCD: CPT | Performed by: STUDENT IN AN ORGANIZED HEALTH CARE EDUCATION/TRAINING PROGRAM

## 2024-11-13 PROCEDURE — 1159F MED LIST DOCD IN RCRD: CPT | Performed by: STUDENT IN AN ORGANIZED HEALTH CARE EDUCATION/TRAINING PROGRAM

## 2024-11-13 PROCEDURE — 92550 TYMPANOMETRY & REFLEX THRESH: CPT | Performed by: AUDIOLOGIST

## 2024-11-13 PROCEDURE — 99213 OFFICE O/P EST LOW 20 MIN: CPT | Performed by: STUDENT IN AN ORGANIZED HEALTH CARE EDUCATION/TRAINING PROGRAM

## 2024-11-13 PROCEDURE — 92557 COMPREHENSIVE HEARING TEST: CPT | Performed by: AUDIOLOGIST

## 2024-11-13 PROCEDURE — 3075F SYST BP GE 130 - 139MM HG: CPT | Performed by: STUDENT IN AN ORGANIZED HEALTH CARE EDUCATION/TRAINING PROGRAM

## 2024-11-13 SDOH — ECONOMIC STABILITY: FOOD INSECURITY: WITHIN THE PAST 12 MONTHS, THE FOOD YOU BOUGHT JUST DIDN'T LAST AND YOU DIDN'T HAVE MONEY TO GET MORE.: NEVER TRUE

## 2024-11-13 SDOH — ECONOMIC STABILITY: FOOD INSECURITY: WITHIN THE PAST 12 MONTHS, YOU WORRIED THAT YOUR FOOD WOULD RUN OUT BEFORE YOU GOT MONEY TO BUY MORE.: NEVER TRUE

## 2024-11-13 ASSESSMENT — ENCOUNTER SYMPTOMS
DEPRESSION: 0
LOSS OF SENSATION IN FEET: 0
OCCASIONAL FEELINGS OF UNSTEADINESS: 0

## 2024-11-13 ASSESSMENT — LIFESTYLE VARIABLES
AUDIT-C TOTAL SCORE: 0
HOW OFTEN DO YOU HAVE A DRINK CONTAINING ALCOHOL: NEVER
HOW OFTEN DO YOU HAVE SIX OR MORE DRINKS ON ONE OCCASION: NEVER
HOW MANY STANDARD DRINKS CONTAINING ALCOHOL DO YOU HAVE ON A TYPICAL DAY: PATIENT DOES NOT DRINK
SKIP TO QUESTIONS 9-10: 1

## 2024-11-13 ASSESSMENT — COLUMBIA-SUICIDE SEVERITY RATING SCALE - C-SSRS
2. HAVE YOU ACTUALLY HAD ANY THOUGHTS OF KILLING YOURSELF?: NO
6. HAVE YOU EVER DONE ANYTHING, STARTED TO DO ANYTHING, OR PREPARED TO DO ANYTHING TO END YOUR LIFE?: NO
1. IN THE PAST MONTH, HAVE YOU WISHED YOU WERE DEAD OR WISHED YOU COULD GO TO SLEEP AND NOT WAKE UP?: NO

## 2024-11-13 ASSESSMENT — PAIN SCALES - GENERAL: PAINLEVEL_OUTOF10: 0-NO PAIN

## 2024-11-13 NOTE — PROGRESS NOTES
Subjective   Patient ID: Charlotte Phan is a 69 y.o. female who presents for Hearing Problem  History: 67 year-old female self-referred for evaluation of multiple concerns. She presents today with her  who will also be seen.    The patient reports that she has started noticing that the right ear has increased hearing loss. No recent audiogram. She is noting occasional episodes of vertigo. Sometimes episodes have lasted 1-12 hours. Gets intermittent tinnitus but no aural fullness. MRI brain demonstrated some chronic hematoma. No brain lesion per her report. She denies otalgia, otorrhea but does get itching. She denies a history of prior ear surgery, exposure to ototoxic drugs or agents. 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    She is also noting frequent sinus infections. She notes she will get an infection roughly once a year. Otherwise notes some mild nasal congestion at night no other nasal concerns.    She is also noting snoring; somewhat positional. No witnessed apneas. Has some daytime fatigue but this is somewhat attributed to poor sleep hygiene.    Update 11/15/2023:  Follow-up for sudden sensorineural hearing loss.  Occasionally noting some crackling sound on the left.  Otherwise has not appreciated improvement in hearing.  Notes persistent right-sided aural fullness.  Here today for last of planned injections.    Update 11/30/2023:  Follow-up for sudden sensorineural hearing loss.  Has not appreciated significant change in hearing since last visit.  Feels like she may be getting used to hearing change.  Aural fullness on the right has come and gone.    Update 11/13/2024:  No changes in hearing. Here today with follow-up audiogram. Still gets occasional     Objective   Physical Exam  CONSTITUTIONAL: Well appearing female who appears stated age.  PSYCHIATRIC: Alert, appropriate mood and affect.  RESPIRATORY: Normal inspiration and  expiration and chest wall expansion; no use of accessory muscles to breathe.  VOICE: Clear speech without hoarseness. No stridor nor stertor.  HEAD AND FACE: Symmetric facial features. No cutaneous masses or lesions were visualized.  RIGHT EAR:  Normal external ear and post auricular area, no visible lesions, external auditory canal clear, tympanic membrane intact, no retraction, no signs of mass, effusion, or infection within the middle ear.  LEFT EAR: Normal external ear and post auricular area, no visible lesions, external auditory canal patent, tympanic membrane intact with prior injection site healed, no retraction, no signs of mass, effusion, or infection within the middle ear.    --------------------------------------------------  Audiology:  I personally reviewed the patient's audiogram from today and compared to her prior.  This demonstrated a stable asymmetric sensorineural hearing loss.  She has a bilateral normal sloping to moderate sensorineural hearing loss with the right ear worse than the left at multiple frequencies.  Word recognition improved on the right from 88% to 96% and remains excellent on the left.  She had type A tympanograms bilaterally.    11/30/2023    -------------------------------------------------    Assessment/Plan  Diagnoses and all orders for this visit:  Asymmetrical sensorineural hearing loss      69 y.o. female presenting with sudden sensorineural hearing loss in the setting of prior sensorineural hearing loss.    1.  Sudden sensorineural hearing loss, bilateral sensorineural hearing loss  The patient initially presented with known bilateral sensorineural hearing loss worse on the right with sudden worsening on the left after several trips and what sounded like acute viral upper respiratory infection she noted sudden onset worsening of left-sided aural fullness. She had recent MRI imaging that demonstrated no concerning lesions.  She first trialed a weeklong course of high-dose  steroid with subjective improvement initially in symptoms.  However, repeat audiogram demonstrated persistent drop in sensorineural hearing loss on the left.  She is now status post salvage intratympanic steroid injections without change.    Repeat audiogram demonstrated relatively stable hearing.  She has persistent asymmetry that is not worse nor better.  We again discussed consideration for amplification and she is now considering this.  I recommended follow-up audiogram and visit with me in 12 months to check on stability.  If hearing is relatively stable at that time she should continue with yearly audiograms but may not need follow-up with me.    This note was created using speech recognition transcription software. Despite proofreading, typographical errors may be present that affect the meaning of the content. Please contact my office with any questions.

## 2024-11-13 NOTE — PATIENT INSTRUCTIONS
Thank you for visiting our office today. For future questions or appointments please try to call the office directly at [phone].

## 2024-11-13 NOTE — PROGRESS NOTES
"AUDIOLOGY ADULT AUDIOMETRIC EVALUATION      Name:  Charlotte Phan  :  1955  Age:  69 y.o.  Date of Evaluation:  2024     HISTORY  Reason for visit:  hearing loss  Ms. Phan is seen 2024 at the request of Shaquille Torres M.D. for an evaluation of hearing.      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    RESULTS   Otoscopic Evaluation: clear canals bilaterally      Immittance Measures (226 Hz probe tone):   Tympanometry is consistent with normal middle ear pressure and normal tympanic membrane mobility bilaterally.       Ipsilateral acoustic reflexes (500-4000 Hz) are absent for the right ear and present for the left ear for 500-2000 Hz (absent 4000 Hz).      Test technique:  standard behavioral technique via insert earphones.  Reliability is good.    Pure Tone Audiometry:    Right ear:  Hearing sensitivity is in the mild to moderate hearing loss range.   Left ear: Hearing sensitivity is in the normal hearing to moderate hearing loss range.    Note asymmetry for 125-2000 Hz (right worse than left)    Speech Audiometry:        Right Ear:  Speech Reception Threshold (SRT) was obtained at 40 dBHL                 Speech discrimination score was 60% " in quiet when words were presented at 90 dBHL      Left Ear:  Speech Reception Threshold (SRT) was obtained at 30 dBHL                 Speech discrimination score was 100% in quiet when words were presented at 80 dBHL    IMPRESSIONS:  In comparison with previous audiogram of 10/31/2023, hearing is essentially stable bilaterally.      Patient is expected to have communication difficulty in adverse listening environments.    Patient is expected to benefit from devices that provide amplification (e.g., hearing aids) and improve the desired sound signal over that of background noise as well as from effective communication strategies.      RECOMMENDATIONS  Continue with ENT follow-up with Shaquille Torres M.D.   Hearing Aid Evaluation with an audiologist to discuss hearing technology (such as hearing aids) and services.  Reassess hearing in 1 year (or sooner if medically indicated or if there is a concern for a change in hearing).    Continue with medical follow-up as indicated.       PATIENT EDUCATION  Discussed results and recommendations with patient.  Questions were addressed and the patient was encouraged to contact our department should concerns arise.       JUSTIN Condon, [facility]  Licensed Audiologist

## 2025-02-25 DIAGNOSIS — E03.9 HYPOTHYROIDISM, UNSPECIFIED TYPE: ICD-10-CM

## 2025-02-25 DIAGNOSIS — E55.9 VITAMIN D DEFICIENCY: ICD-10-CM

## 2025-02-25 DIAGNOSIS — E78.5 HYPERLIPIDEMIA, UNSPECIFIED HYPERLIPIDEMIA TYPE: Primary | ICD-10-CM

## 2025-02-25 DIAGNOSIS — Z00.00 HEALTHCARE MAINTENANCE: ICD-10-CM

## 2025-02-25 DIAGNOSIS — Z13.0 SCREENING FOR DEFICIENCY ANEMIA: ICD-10-CM

## 2025-02-25 DIAGNOSIS — K21.9 GASTROESOPHAGEAL REFLUX DISEASE, UNSPECIFIED WHETHER ESOPHAGITIS PRESENT: ICD-10-CM

## 2025-03-06 LAB
25(OH)D3+25(OH)D2 SERPL-MCNC: 21 NG/ML (ref 30–100)
ALBUMIN SERPL-MCNC: 4.4 G/DL (ref 3.6–5.1)
ALP SERPL-CCNC: 81 U/L (ref 37–153)
ALT SERPL-CCNC: 22 U/L (ref 6–29)
ANION GAP SERPL CALCULATED.4IONS-SCNC: 7 MMOL/L (CALC) (ref 7–17)
AST SERPL-CCNC: 25 U/L (ref 10–35)
BASOPHILS # BLD AUTO: 50 CELLS/UL (ref 0–200)
BASOPHILS NFR BLD AUTO: 1 %
BILIRUB SERPL-MCNC: 0.4 MG/DL (ref 0.2–1.2)
BUN SERPL-MCNC: 13 MG/DL (ref 7–25)
CALCIUM SERPL-MCNC: 9.5 MG/DL (ref 8.6–10.4)
CHLORIDE SERPL-SCNC: 104 MMOL/L (ref 98–110)
CHOLEST SERPL-MCNC: 233 MG/DL
CHOLEST/HDLC SERPL: 3.9 (CALC)
CO2 SERPL-SCNC: 30 MMOL/L (ref 20–32)
CREAT SERPL-MCNC: 0.82 MG/DL (ref 0.5–1.05)
EGFRCR SERPLBLD CKD-EPI 2021: 77 ML/MIN/1.73M2
EOSINOPHIL # BLD AUTO: 130 CELLS/UL (ref 15–500)
EOSINOPHIL NFR BLD AUTO: 2.6 %
ERYTHROCYTE [DISTWIDTH] IN BLOOD BY AUTOMATED COUNT: 13.1 % (ref 11–15)
GLUCOSE SERPL-MCNC: 101 MG/DL (ref 65–99)
HBA1C MFR BLD: 5.9 % OF TOTAL HGB
HCT VFR BLD AUTO: 43.8 % (ref 35–45)
HDLC SERPL-MCNC: 60 MG/DL
HGB BLD-MCNC: 14.4 G/DL (ref 11.7–15.5)
LDLC SERPL CALC-MCNC: 149 MG/DL (CALC)
LYMPHOCYTES # BLD AUTO: 1770 CELLS/UL (ref 850–3900)
LYMPHOCYTES NFR BLD AUTO: 35.4 %
MAGNESIUM SERPL-MCNC: 2.3 MG/DL (ref 1.5–2.5)
MCH RBC QN AUTO: 28.8 PG (ref 27–33)
MCHC RBC AUTO-ENTMCNC: 32.9 G/DL (ref 32–36)
MCV RBC AUTO: 87.6 FL (ref 80–100)
MONOCYTES # BLD AUTO: 370 CELLS/UL (ref 200–950)
MONOCYTES NFR BLD AUTO: 7.4 %
NEUTROPHILS # BLD AUTO: 2680 CELLS/UL (ref 1500–7800)
NEUTROPHILS NFR BLD AUTO: 53.6 %
NONHDLC SERPL-MCNC: 173 MG/DL (CALC)
PLATELET # BLD AUTO: 222 THOUSAND/UL (ref 140–400)
PMV BLD REES-ECKER: 12.3 FL (ref 7.5–12.5)
POTASSIUM SERPL-SCNC: 4.8 MMOL/L (ref 3.5–5.3)
PROT SERPL-MCNC: 6.9 G/DL (ref 6.1–8.1)
RBC # BLD AUTO: 5 MILLION/UL (ref 3.8–5.1)
SODIUM SERPL-SCNC: 141 MMOL/L (ref 135–146)
T4 FREE SERPL-MCNC: 1.3 NG/DL (ref 0.8–1.8)
TRIGL SERPL-MCNC: 119 MG/DL
TSH SERPL-ACNC: 6.7 MIU/L (ref 0.4–4.5)
WBC # BLD AUTO: 5 THOUSAND/UL (ref 3.8–10.8)

## 2025-03-11 ENCOUNTER — APPOINTMENT (OUTPATIENT)
Dept: PRIMARY CARE | Facility: CLINIC | Age: 70
End: 2025-03-11
Payer: MEDICARE

## 2025-03-11 VITALS
SYSTOLIC BLOOD PRESSURE: 118 MMHG | BODY MASS INDEX: 26.4 KG/M2 | WEIGHT: 154.6 LBS | HEIGHT: 64 IN | HEART RATE: 65 BPM | DIASTOLIC BLOOD PRESSURE: 74 MMHG | OXYGEN SATURATION: 96 %

## 2025-03-11 DIAGNOSIS — Z87.11 HISTORY OF PEPTIC ULCER: ICD-10-CM

## 2025-03-11 DIAGNOSIS — M19.049 HAND ARTHRITIS: ICD-10-CM

## 2025-03-11 DIAGNOSIS — E03.9 HYPOTHYROIDISM, UNSPECIFIED TYPE: ICD-10-CM

## 2025-03-11 DIAGNOSIS — E78.5 DYSLIPIDEMIA: Primary | ICD-10-CM

## 2025-03-11 DIAGNOSIS — I77.810 DILATION OF THORACIC AORTA (CMS-HCC): ICD-10-CM

## 2025-03-11 DIAGNOSIS — K27.9 PEPTIC ULCER: ICD-10-CM

## 2025-03-11 DIAGNOSIS — Z12.31 ENCOUNTER FOR SCREENING MAMMOGRAM FOR MALIGNANT NEOPLASM OF BREAST: ICD-10-CM

## 2025-03-11 DIAGNOSIS — M15.9 GENERALIZED OSTEOARTHRITIS OF HAND: ICD-10-CM

## 2025-03-11 PROCEDURE — 99215 OFFICE O/P EST HI 40 MIN: CPT | Performed by: STUDENT IN AN ORGANIZED HEALTH CARE EDUCATION/TRAINING PROGRAM

## 2025-03-11 PROCEDURE — 1125F AMNT PAIN NOTED PAIN PRSNT: CPT | Performed by: STUDENT IN AN ORGANIZED HEALTH CARE EDUCATION/TRAINING PROGRAM

## 2025-03-11 PROCEDURE — 3078F DIAST BP <80 MM HG: CPT | Performed by: STUDENT IN AN ORGANIZED HEALTH CARE EDUCATION/TRAINING PROGRAM

## 2025-03-11 PROCEDURE — 1159F MED LIST DOCD IN RCRD: CPT | Performed by: STUDENT IN AN ORGANIZED HEALTH CARE EDUCATION/TRAINING PROGRAM

## 2025-03-11 PROCEDURE — 3074F SYST BP LT 130 MM HG: CPT | Performed by: STUDENT IN AN ORGANIZED HEALTH CARE EDUCATION/TRAINING PROGRAM

## 2025-03-11 PROCEDURE — 3008F BODY MASS INDEX DOCD: CPT | Performed by: STUDENT IN AN ORGANIZED HEALTH CARE EDUCATION/TRAINING PROGRAM

## 2025-03-11 PROCEDURE — G2211 COMPLEX E/M VISIT ADD ON: HCPCS | Performed by: STUDENT IN AN ORGANIZED HEALTH CARE EDUCATION/TRAINING PROGRAM

## 2025-03-11 RX ORDER — ROSUVASTATIN CALCIUM 5 MG/1
5 TABLET, COATED ORAL EVERY OTHER DAY
Qty: 45 TABLET | Refills: 3 | Status: SHIPPED | OUTPATIENT
Start: 2025-03-11

## 2025-03-11 RX ORDER — LEVOTHYROXINE SODIUM 88 UG/1
88 TABLET ORAL DAILY
Qty: 90 TABLET | Refills: 1 | Status: SHIPPED | OUTPATIENT
Start: 2025-03-11 | End: 2025-09-07

## 2025-03-11 ASSESSMENT — PAIN SCALES - GENERAL: PAINLEVEL_OUTOF10: 6

## 2025-03-11 NOTE — PATIENT INSTRUCTIONS
Thank you for coming in today!    Medication adjustments:   - Levothyroxine to 88mcg daily in AM on empty stomach  - Rosuvastatin 5mg every other day for cholesterol; take in PM before bed if possible    Imaging studies:   - Hand X-Rays (stop in [address] to get today)  - CT Chest without contrast --to follow up the thoracic aortic dilation; can schedule in [address] when getting your hand x-rays completed  - Mammogram (OK to do at [place] if your preference)     Endoscopy with Dr. Mani Cottrell   [phone]    Labs in 6-8 weeks after medication adjustments  Make sure these are fasting    Referral for your hand arthritis to Dr. Luna from our orthopedic hand division  Call [phone] to schedule    Consider your Tetanus, Pneumonia, and Shingles shots     Follow up in 4 months!    Best,  Dr. CONKLNI

## 2025-03-20 NOTE — PROGRESS NOTES
[facility]  Hand and Upper Extremity Service  Initial evaluation / Consultation         Consult requested by Referring Physician: Dr. Baca     Chief Complaint: Bilateral hands         69 y.o right hand dominant female presenting for longstanding, left worse than right, bilateral basal thumb pain. She's a  and some of these activities has aggravated symptoms but she also has a difficult time with pinching, grasping, and twisting maneuvers. She's only treated with Motrin and activity modifications. Her Primary care physician ordered x-rays which were obtained yesterday.          Please refer to New Patient Intake Form scanned into patient's electronic record for self reported past medical history, past surgical history, medications, allergies, family history, social history and 10 point review of systems    Examination:  Constitutional: Oriented to person, place, and time.  Appears well-developed and well-nourished.  Head: Normocephalic and atraumatic.  Eyes: Pupils are equal, round, and reactive to light.  Cardiovascular: Intact distal pulses.  Pulmonary/Chest/Breast: Effort normal. No respiratory distress.  Neurological: Alert and oriented to person, place, and time.  Skin: Skin is warm and dry.  Psychiatric: normal mood and affect.  Behavior is normal.  Musculoskeletal: Bilateral hands reveal soft tissue prominence around thumb CMC joints. Positive CMC grind test bilaterally with crepitus, more severe on left than right. No MP joint hyperextension instability of thumb but trace amount of ulnar collateral ligament instability of the left thumb. No thenar atrophy. Full finger and thumb flexion without triggering. Subjectively normal sensation.        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        Impression:  Bilateral thumb arthritis        Plan: We discussed a variety of different treatment options including supplements such as CBD, glucosamine, tumeric, and fish oil. We discussed the benefits of heat therapy and other topical analgesics. We provided her with bilateral comfort cool splints and she's agreed to proceed with left thumb CMC joint injection.       In Office Procedures Performed: Left thumb CMC injection   S Inj/Asp: L thumb CMC on 3/25/2025 12:21 PM  Indications: pain  Details: 25 G needle, dorsal approach  Medications: 20 mg triamcinolone acetonide 40 mg/mL; 0.5 mL lidocaine 10 mg/mL (1 %)  Outcome: tolerated well, no immediate complications  Procedure, treatment alternatives, risks and benefits explained, specific risks discussed. Consent was given by the patient. Immediately prior to procedure a time out was called to verify the correct patient, procedure, equipment, support staff and site/side marked as required. Patient was prepped and draped in the usual sterile fashion.       Patient was prescribed a Comfort Cool for CMC arthritis. The patient has weakness, instability and/or deformity of their bilateral thumbs which requires stabilization from this orthosis to improve their function.      Verbal and written instructions for the use, wear schedule, cleaning and application of this item were given.  Patient was instructed that should the brace result in increased pain, decreased sensation, increased swelling, or an overall worsening of their medical condition, to please contact our office immediately.     Orthotic management and training was provided for skin care, modifications due to healing tissues, edema changes, interruption in skin integrity, and safety precautions with the orthosis.          Follow up: As needed              Abdon Luna MD  [facility]  Department of Orthopaedic Surgery  Hand and Upper Extremity Reconstruction      Scribe Attestation  By signing my name below, I,  Marlene Hernandez   attest that this documentation has been prepared under the direction and in the presence of Dr. Abdon Luna.      Dictation performed with the use of voice recognition software.  Syntax and grammatical errors may exist.

## 2025-03-24 ENCOUNTER — HOSPITAL ENCOUNTER (OUTPATIENT)
Dept: RADIOLOGY | Facility: CLINIC | Age: 70
Discharge: HOME | End: 2025-03-24
Payer: MEDICARE

## 2025-03-24 DIAGNOSIS — M15.9 GENERALIZED OSTEOARTHRITIS OF HAND: ICD-10-CM

## 2025-03-24 PROCEDURE — 73130 X-RAY EXAM OF HAND: CPT | Mod: RT

## 2025-03-24 PROCEDURE — 73130 X-RAY EXAM OF HAND: CPT | Mod: RIGHT SIDE | Performed by: RADIOLOGY

## 2025-03-24 PROCEDURE — 73130 X-RAY EXAM OF HAND: CPT | Mod: LT

## 2025-03-24 PROCEDURE — 73130 X-RAY EXAM OF HAND: CPT | Mod: LEFT SIDE | Performed by: RADIOLOGY

## 2025-03-25 ENCOUNTER — OFFICE VISIT (OUTPATIENT)
Dept: ORTHOPEDIC SURGERY | Facility: HOSPITAL | Age: 70
End: 2025-03-25
Payer: MEDICARE

## 2025-03-25 VITALS — HEIGHT: 64 IN | WEIGHT: 154 LBS | BODY MASS INDEX: 26.29 KG/M2

## 2025-03-25 DIAGNOSIS — M15.9 GENERALIZED OSTEOARTHRITIS OF HAND: ICD-10-CM

## 2025-03-25 DIAGNOSIS — M19.049 CMC ARTHRITIS: Primary | ICD-10-CM

## 2025-03-25 PROCEDURE — 20600 DRAIN/INJ JOINT/BURSA W/O US: CPT | Mod: LT | Performed by: ORTHOPAEDIC SURGERY

## 2025-03-25 PROCEDURE — 3008F BODY MASS INDEX DOCD: CPT | Performed by: ORTHOPAEDIC SURGERY

## 2025-03-25 PROCEDURE — 1036F TOBACCO NON-USER: CPT | Performed by: ORTHOPAEDIC SURGERY

## 2025-03-25 PROCEDURE — 2500000004 HC RX 250 GENERAL PHARMACY W/ HCPCS (ALT 636 FOR OP/ED): Performed by: ORTHOPAEDIC SURGERY

## 2025-03-25 PROCEDURE — 99214 OFFICE O/P EST MOD 30 MIN: CPT | Mod: 25 | Performed by: ORTHOPAEDIC SURGERY

## 2025-03-25 PROCEDURE — 99204 OFFICE O/P NEW MOD 45 MIN: CPT | Performed by: ORTHOPAEDIC SURGERY

## 2025-03-25 PROCEDURE — 1159F MED LIST DOCD IN RCRD: CPT | Performed by: ORTHOPAEDIC SURGERY

## 2025-03-25 PROCEDURE — L3924 HFO WITHOUT JOINTS PRE OTS: HCPCS | Performed by: ORTHOPAEDIC SURGERY

## 2025-03-25 RX ORDER — LIDOCAINE HYDROCHLORIDE 10 MG/ML
0.5 INJECTION, SOLUTION INFILTRATION; PERINEURAL
Status: COMPLETED | OUTPATIENT
Start: 2025-03-25 | End: 2025-03-25

## 2025-03-25 RX ORDER — TRIAMCINOLONE ACETONIDE 40 MG/ML
20 INJECTION, SUSPENSION INTRA-ARTICULAR; INTRAMUSCULAR
Status: COMPLETED | OUTPATIENT
Start: 2025-03-25 | End: 2025-03-25

## 2025-03-25 RX ADMIN — LIDOCAINE HYDROCHLORIDE 0.5 ML: 10 INJECTION, SOLUTION INFILTRATION; PERINEURAL at 12:21

## 2025-03-25 RX ADMIN — TRIAMCINOLONE ACETONIDE 20 MG: 40 INJECTION, SUSPENSION INTRA-ARTICULAR; INTRAMUSCULAR at 12:21

## 2025-03-25 ASSESSMENT — PAIN DESCRIPTION - DESCRIPTORS: DESCRIPTORS: ACHING;SORE

## 2025-03-25 ASSESSMENT — PAIN - FUNCTIONAL ASSESSMENT: PAIN_FUNCTIONAL_ASSESSMENT: 0-10

## 2025-03-25 ASSESSMENT — PAIN SCALES - GENERAL: PAINLEVEL_OUTOF10: 5 - MODERATE PAIN

## 2025-03-25 NOTE — LETTER
March 25, 2025     Pillo Baca MD  [address]  [facility], [facility] [address]  [facility] [address]    Patient: Charlotte Phan   YOB: 1955   Date of Visit: 3/25/2025       Dear Dr. Pillo Baca MD:    Thank you for referring Charlotte Phan to me for evaluation. Below are my notes for this consultation.  If you have questions, please do not hesitate to call me. I look forward to following your patient along with you.       Sincerely,     Abdon Luna MD      CC: No Recipients  ______________________________________________________________________________________    [facility]  Hand and Upper Extremity Service  Initial evaluation / Consultation         Consult requested by Referring Physician: Dr. Baca     Chief Complaint: Bilateral hands         69 y.o right hand dominant female presenting for longstanding, left worse than right, bilateral basal thumb pain. She's a  and some of these activities has aggravated symptoms but she also has a difficult time with pinching, grasping, and twisting maneuvers. She's only treated with Motrin and activity modifications. Her Primary care physician ordered x-rays which were obtained yesterday.          Please refer to New Patient Intake Form scanned into patient's electronic record for self reported past medical history, past surgical history, medications, allergies, family history, social history and 10 point review of systems    Examination:  Constitutional: Oriented to person, place, and time.  Appears well-developed and well-nourished.  Head: Normocephalic and atraumatic.  Eyes: Pupils are equal, round, and reactive to light.  Cardiovascular: Intact distal pulses.  Pulmonary/Chest/Breast: Effort normal. No respiratory distress.  Neurological: Alert and oriented to person, place, and time.  Skin: Skin is warm and dry.  Psychiatric: normal mood and affect.  Behavior is  normal.  Musculoskeletal: Bilateral hands reveal soft tissue prominence around thumb CMC joints. Positive CMC grind test bilaterally with crepitus, more severe on left than right. No MP joint hyperextension instability of thumb but trace amount of ulnar collateral ligament instability of the left thumb. No thenar atrophy. Full finger and thumb flexion without triggering. Subjectively normal sensation.        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        Impression:  Bilateral thumb arthritis       Plan: We discussed a variety of different treatment options including supplements such as CBD, glucosamine, tumeric, and fish oil. We discussed the benefits of heat therapy and other topical analgesics. We provided her with bilateral comfort cool splints and she's agreed to proceed with left thumb CMC joint injection.       In Office Procedures Performed: Left thumb CMC injection   S Inj/Asp: L thumb CMC on 3/25/2025 12:21 PM  Indications: pain  Details: 25 G needle, dorsal approach  Medications: 20 mg triamcinolone acetonide 40 mg/mL; 0.5 mL lidocaine 10 mg/mL (1 %)  Outcome: tolerated well, no immediate complications  Procedure, treatment alternatives, risks and benefits explained, specific risks discussed. Consent was given by the patient. Immediately prior to procedure a time out was called to verify the correct patient, procedure, equipment, support staff and site/side marked as required. Patient was prepped and draped in the usual sterile fashion.       Patient was prescribed a Comfort Cool for CMC arthritis. The patient has weakness, instability and/or deformity of their bilateral thumbs which requires stabilization from this orthosis to improve their function.      Verbal and written instructions for the use, wear schedule, cleaning and application of this item were given.  Patient was  instructed that should the brace result in increased pain, decreased sensation, increased swelling, or an overall worsening of their medical condition, to please contact our office immediately.     Orthotic management and training was provided for skin care, modifications due to healing tissues, edema changes, interruption in skin integrity, and safety precautions with the orthosis.          Follow up: As needed              Abdon Luna MD  [facility]  Department of Orthopaedic Surgery  Hand and Upper Extremity Reconstruction      Scribe Attestation  By signing my name below, I, Subha Shelley , [employer]   attest that this documentation has been prepared under the direction and in the presence of Dr. Abdon Luna.      Dictation performed with the use of voice recognition software.  Syntax and grammatical errors may exist.

## 2025-04-15 ENCOUNTER — TELEPHONE (OUTPATIENT)
Dept: PRIMARY CARE | Facility: CLINIC | Age: 70
End: 2025-04-15
Payer: MEDICARE

## 2025-04-15 DIAGNOSIS — I10 PRIMARY HYPERTENSION: ICD-10-CM

## 2025-04-15 NOTE — TELEPHONE ENCOUNTER
Pt wanted to know if you could put an order in for her to have a strep test done?  The pt's last ov was 3/11/25.

## 2025-04-19 RX ORDER — AMLODIPINE BESYLATE 2.5 MG/1
2.5 TABLET ORAL DAILY
Qty: 90 TABLET | Refills: 3 | Status: SHIPPED | OUTPATIENT
Start: 2025-04-19 | End: 2026-04-19

## 2025-05-14 LAB
ALBUMIN SERPL-MCNC: 4.4 G/DL (ref 3.6–5.1)
ALP SERPL-CCNC: 74 U/L (ref 37–153)
ALT SERPL-CCNC: 13 U/L (ref 6–29)
ANION GAP SERPL CALCULATED.4IONS-SCNC: 9 MMOL/L (CALC) (ref 7–17)
AST SERPL-CCNC: 16 U/L (ref 10–35)
BILIRUB SERPL-MCNC: 0.5 MG/DL (ref 0.2–1.2)
BUN SERPL-MCNC: 18 MG/DL (ref 7–25)
CALCIUM SERPL-MCNC: 9.5 MG/DL (ref 8.6–10.4)
CCP IGG SERPL-ACNC: <16 UNITS
CHLORIDE SERPL-SCNC: 104 MMOL/L (ref 98–110)
CHOLEST SERPL-MCNC: 228 MG/DL
CHOLEST/HDLC SERPL: 3 (CALC)
CK SERPL-CCNC: 65 U/L (ref 20–243)
CO2 SERPL-SCNC: 30 MMOL/L (ref 20–32)
CREAT SERPL-MCNC: 0.92 MG/DL (ref 0.5–1.05)
CRP SERPL-MCNC: <3 MG/L
EGFRCR SERPLBLD CKD-EPI 2021: 67 ML/MIN/1.73M2
ERYTHROCYTE [SEDIMENTATION RATE] IN BLOOD BY WESTERGREN METHOD: 9 MM/H
GLUCOSE SERPL-MCNC: 104 MG/DL (ref 65–99)
HDLC SERPL-MCNC: 75 MG/DL
LDLC SERPL CALC-MCNC: 128 MG/DL (CALC)
NONHDLC SERPL-MCNC: 153 MG/DL (CALC)
POTASSIUM SERPL-SCNC: 4.4 MMOL/L (ref 3.5–5.3)
PROT SERPL-MCNC: 6.8 G/DL (ref 6.1–8.1)
RHEUMATOID FACT SERPL-ACNC: <10 IU/ML
SODIUM SERPL-SCNC: 143 MMOL/L (ref 135–146)
TRIGL SERPL-MCNC: 135 MG/DL
TSH SERPL-ACNC: 1.86 MIU/L (ref 0.4–4.5)
URATE SERPL-MCNC: 6.4 MG/DL (ref 2.5–7)

## 2025-05-22 ENCOUNTER — TELEPHONE (OUTPATIENT)
Dept: PRIMARY CARE | Facility: CLINIC | Age: 70
End: 2025-05-22

## 2025-06-09 ENCOUNTER — APPOINTMENT (OUTPATIENT)
Dept: RADIOLOGY | Facility: CLINIC | Age: 70
End: 2025-06-09
Payer: MEDICARE

## 2025-06-09 DIAGNOSIS — I77.810 DILATION OF THORACIC AORTA: ICD-10-CM

## 2025-06-09 PROCEDURE — 71250 CT THORAX DX C-: CPT | Performed by: RADIOLOGY

## 2025-06-09 PROCEDURE — 71250 CT THORAX DX C-: CPT

## 2025-06-11 DIAGNOSIS — R91.1 PULMONARY NODULE: Primary | ICD-10-CM

## 2025-08-11 ENCOUNTER — APPOINTMENT (OUTPATIENT)
Dept: CARDIOLOGY | Facility: CLINIC | Age: 70
End: 2025-08-11
Payer: MEDICARE

## 2025-08-12 ENCOUNTER — APPOINTMENT (OUTPATIENT)
Dept: PRIMARY CARE | Facility: CLINIC | Age: 70
End: 2025-08-12
Payer: MEDICARE

## 2025-08-12 VITALS
DIASTOLIC BLOOD PRESSURE: 72 MMHG | HEART RATE: 64 BPM | OXYGEN SATURATION: 96 % | SYSTOLIC BLOOD PRESSURE: 114 MMHG | BODY MASS INDEX: 26.09 KG/M2 | WEIGHT: 152 LBS

## 2025-08-12 DIAGNOSIS — D18.00 CAVERNOMA: ICD-10-CM

## 2025-08-12 DIAGNOSIS — I77.810 DILATION OF THORACIC AORTA: Primary | ICD-10-CM

## 2025-08-12 DIAGNOSIS — Z78.0 POSTMENOPAUSAL: ICD-10-CM

## 2025-08-12 DIAGNOSIS — Z00.00 MEDICARE ANNUAL WELLNESS VISIT, SUBSEQUENT: ICD-10-CM

## 2025-08-12 PROCEDURE — 3074F SYST BP LT 130 MM HG: CPT | Performed by: STUDENT IN AN ORGANIZED HEALTH CARE EDUCATION/TRAINING PROGRAM

## 2025-08-12 PROCEDURE — 99214 OFFICE O/P EST MOD 30 MIN: CPT | Performed by: STUDENT IN AN ORGANIZED HEALTH CARE EDUCATION/TRAINING PROGRAM

## 2025-08-12 PROCEDURE — 1123F ACP DISCUSS/DSCN MKR DOCD: CPT | Performed by: STUDENT IN AN ORGANIZED HEALTH CARE EDUCATION/TRAINING PROGRAM

## 2025-08-12 PROCEDURE — G0439 PPPS, SUBSEQ VISIT: HCPCS | Performed by: STUDENT IN AN ORGANIZED HEALTH CARE EDUCATION/TRAINING PROGRAM

## 2025-08-12 PROCEDURE — 1125F AMNT PAIN NOTED PAIN PRSNT: CPT | Performed by: STUDENT IN AN ORGANIZED HEALTH CARE EDUCATION/TRAINING PROGRAM

## 2025-08-12 PROCEDURE — 1170F FXNL STATUS ASSESSED: CPT | Performed by: STUDENT IN AN ORGANIZED HEALTH CARE EDUCATION/TRAINING PROGRAM

## 2025-08-12 PROCEDURE — 1159F MED LIST DOCD IN RCRD: CPT | Performed by: STUDENT IN AN ORGANIZED HEALTH CARE EDUCATION/TRAINING PROGRAM

## 2025-08-12 PROCEDURE — 1036F TOBACCO NON-USER: CPT | Performed by: STUDENT IN AN ORGANIZED HEALTH CARE EDUCATION/TRAINING PROGRAM

## 2025-08-12 PROCEDURE — 3078F DIAST BP <80 MM HG: CPT | Performed by: STUDENT IN AN ORGANIZED HEALTH CARE EDUCATION/TRAINING PROGRAM

## 2025-08-12 ASSESSMENT — PAIN SCALES - GENERAL: PAINLEVEL_OUTOF10: 7

## 2025-08-12 ASSESSMENT — ACTIVITIES OF DAILY LIVING (ADL)
MANAGING_FINANCES: INDEPENDENT
TAKING_MEDICATION: INDEPENDENT
DRESSING: INDEPENDENT
DOING_HOUSEWORK: INDEPENDENT
BATHING: INDEPENDENT
GROCERY_SHOPPING: INDEPENDENT

## 2025-08-12 ASSESSMENT — ENCOUNTER SYMPTOMS
OCCASIONAL FEELINGS OF UNSTEADINESS: 0
DEPRESSION: 0
LOSS OF SENSATION IN FEET: 0

## 2025-08-25 ENCOUNTER — ANCILLARY PROCEDURE (OUTPATIENT)
Dept: CARDIOLOGY | Facility: CLINIC | Age: 70
End: 2025-08-25
Payer: MEDICARE

## 2025-08-25 DIAGNOSIS — I77.810 DILATION OF THORACIC AORTA: ICD-10-CM

## 2025-08-25 DIAGNOSIS — I71.20 THORACIC AORTIC ANEURYSM, WITHOUT RUPTURE, UNSPECIFIED: ICD-10-CM

## 2025-08-25 LAB
AORTIC VALVE MEAN GRADIENT: 3 MMHG
AORTIC VALVE PEAK VELOCITY: 1.21 M/S
AV PEAK GRADIENT: 6 MMHG
AVA (PEAK VEL): 2.74 CM2
AVA (VTI): 2.72 CM2
EJECTION FRACTION APICAL 4 CHAMBER: 63.5
EJECTION FRACTION: 67 %
LEFT ATRIUM VOLUME AREA LENGTH INDEX BSA: 22.9 ML/M2
LEFT VENTRICLE INTERNAL DIMENSION DIASTOLE: 3.72 CM (ref 3.5–6)
LEFT VENTRICULAR OUTFLOW TRACT DIAMETER: 1.98 CM
MITRAL VALVE E/A RATIO: 0.83
RIGHT VENTRICLE FREE WALL PEAK S': 10 CM/S
RIGHT VENTRICLE PEAK SYSTOLIC PRESSURE: 19 MMHG
TRICUSPID ANNULAR PLANE SYSTOLIC EXCURSION: 2.7 CM

## 2025-08-25 PROCEDURE — 93306 TTE W/DOPPLER COMPLETE: CPT

## 2025-08-25 PROCEDURE — 93306 TTE W/DOPPLER COMPLETE: CPT | Performed by: INTERNAL MEDICINE

## 2025-09-03 DIAGNOSIS — E03.9 HYPOTHYROIDISM, UNSPECIFIED TYPE: ICD-10-CM

## 2025-09-03 RX ORDER — LEVOTHYROXINE SODIUM 88 UG/1
88 TABLET ORAL DAILY
Qty: 90 TABLET | Refills: 1 | Status: SHIPPED | OUTPATIENT
Start: 2025-09-03

## 2026-08-12 ENCOUNTER — APPOINTMENT (OUTPATIENT)
Dept: PRIMARY CARE | Facility: CLINIC | Age: 71
End: 2026-08-12
Payer: MEDICARE